# Patient Record
Sex: FEMALE | Race: WHITE | NOT HISPANIC OR LATINO | URBAN - METROPOLITAN AREA
[De-identification: names, ages, dates, MRNs, and addresses within clinical notes are randomized per-mention and may not be internally consistent; named-entity substitution may affect disease eponyms.]

---

## 2019-09-12 ENCOUNTER — OFFICE (OUTPATIENT)
Dept: URBAN - METROPOLITAN AREA CLINIC 75 | Facility: CLINIC | Age: 49
End: 2019-09-12

## 2019-09-12 VITALS
RESPIRATION RATE: 15 BRPM | DIASTOLIC BLOOD PRESSURE: 76 MMHG | WEIGHT: 155 LBS | HEIGHT: 65 IN | SYSTOLIC BLOOD PRESSURE: 116 MMHG

## 2019-09-12 DIAGNOSIS — K58.9 IRRITABLE BOWEL SYNDROME WITHOUT DIARRHEA: ICD-10-CM

## 2019-09-12 PROCEDURE — 99203 OFFICE O/P NEW LOW 30 MIN: CPT | Performed by: INTERNAL MEDICINE

## 2020-01-13 ENCOUNTER — OFFICE (OUTPATIENT)
Dept: URBAN - METROPOLITAN AREA CLINIC 75 | Facility: CLINIC | Age: 50
End: 2020-01-13

## 2020-01-13 VITALS
HEIGHT: 65 IN | SYSTOLIC BLOOD PRESSURE: 134 MMHG | DIASTOLIC BLOOD PRESSURE: 84 MMHG | HEART RATE: 72 BPM | WEIGHT: 153 LBS

## 2020-01-13 DIAGNOSIS — K58.9 IRRITABLE BOWEL SYNDROME WITHOUT DIARRHEA: ICD-10-CM

## 2020-01-13 PROCEDURE — 99213 OFFICE O/P EST LOW 20 MIN: CPT | Performed by: INTERNAL MEDICINE

## 2020-01-13 RX ORDER — LUBIPROSTONE 8 UG/1
16 CAPSULE, GELATIN COATED ORAL
Qty: 60 | Refills: 11 | Status: ACTIVE
Start: 2020-01-13

## 2020-12-30 ENCOUNTER — OFFICE (OUTPATIENT)
Dept: URBAN - METROPOLITAN AREA CLINIC 75 | Facility: CLINIC | Age: 50
End: 2020-12-30

## 2020-12-30 VITALS — HEART RATE: 70 BPM | TEMPERATURE: 96.8 F | HEIGHT: 65 IN | WEIGHT: 136 LBS | OXYGEN SATURATION: 99 %

## 2020-12-30 DIAGNOSIS — K58.9 IRRITABLE BOWEL SYNDROME WITHOUT DIARRHEA: ICD-10-CM

## 2020-12-30 DIAGNOSIS — R10.11 RIGHT UPPER QUADRANT PAIN: ICD-10-CM

## 2020-12-30 PROCEDURE — 99214 OFFICE O/P EST MOD 30 MIN: CPT | Performed by: NURSE PRACTITIONER

## 2021-11-20 ENCOUNTER — APPOINTMENT (OUTPATIENT)
Dept: CT IMAGING | Facility: HOSPITAL | Age: 51
End: 2021-11-20

## 2021-11-20 ENCOUNTER — HOSPITAL ENCOUNTER (EMERGENCY)
Facility: HOSPITAL | Age: 51
Discharge: HOME | End: 2021-11-20
Attending: EMERGENCY MEDICINE | Admitting: EMERGENCY MEDICINE

## 2021-11-20 VITALS
HEART RATE: 97 BPM | BODY MASS INDEX: 22.49 KG/M2 | WEIGHT: 135 LBS | DIASTOLIC BLOOD PRESSURE: 80 MMHG | HEIGHT: 65 IN | TEMPERATURE: 98.2 F | SYSTOLIC BLOOD PRESSURE: 127 MMHG | OXYGEN SATURATION: 98 % | RESPIRATION RATE: 18 BRPM

## 2021-11-20 DIAGNOSIS — K29.00 ACUTE GASTRITIS WITHOUT HEMORRHAGE, UNSPECIFIED GASTRITIS TYPE: ICD-10-CM

## 2021-11-20 DIAGNOSIS — R10.13 EPIGASTRIC PAIN: Primary | ICD-10-CM

## 2021-11-20 DIAGNOSIS — Z90.13 HISTORY OF MASTECTOMY, BILATERAL: ICD-10-CM

## 2021-11-20 LAB
ALBUMIN SERPL-MCNC: 4.6 G/DL (ref 3.5–5.2)
ALBUMIN/GLOB SERPL: 1.5 G/DL
ALP SERPL-CCNC: 71 U/L (ref 39–117)
ALT SERPL W P-5'-P-CCNC: 11 U/L (ref 1–33)
ANION GAP SERPL CALCULATED.3IONS-SCNC: 12.6 MMOL/L (ref 5–15)
AST SERPL-CCNC: 16 U/L (ref 1–32)
BASOPHILS # BLD AUTO: 0.04 10*3/MM3 (ref 0–0.2)
BASOPHILS NFR BLD AUTO: 0.4 % (ref 0–1.5)
BILIRUB SERPL-MCNC: 0.3 MG/DL (ref 0–1.2)
BUN SERPL-MCNC: 15 MG/DL (ref 6–20)
BUN/CREAT SERPL: 25.4 (ref 7–25)
CALCIUM SPEC-SCNC: 9.2 MG/DL (ref 8.6–10.5)
CHLORIDE SERPL-SCNC: 103 MMOL/L (ref 98–107)
CO2 SERPL-SCNC: 23.4 MMOL/L (ref 22–29)
CREAT SERPL-MCNC: 0.59 MG/DL (ref 0.57–1)
DEPRECATED RDW RBC AUTO: 42.5 FL (ref 37–54)
EOSINOPHIL # BLD AUTO: 0.13 10*3/MM3 (ref 0–0.4)
EOSINOPHIL NFR BLD AUTO: 1.2 % (ref 0.3–6.2)
ERYTHROCYTE [DISTWIDTH] IN BLOOD BY AUTOMATED COUNT: 12.8 % (ref 12.3–15.4)
GFR SERPL CREATININE-BSD FRML MDRD: 108 ML/MIN/1.73
GFR SERPL CREATININE-BSD FRML MDRD: 131 ML/MIN/1.73
GLOBULIN UR ELPH-MCNC: 3 GM/DL
GLUCOSE SERPL-MCNC: 107 MG/DL (ref 65–99)
HCT VFR BLD AUTO: 38 % (ref 34–46.6)
HGB BLD-MCNC: 12.9 G/DL (ref 12–15.9)
HOLD SPECIMEN: NORMAL
IMM GRANULOCYTES # BLD AUTO: 0.03 10*3/MM3 (ref 0–0.05)
IMM GRANULOCYTES NFR BLD AUTO: 0.3 % (ref 0–0.5)
LIPASE SERPL-CCNC: 56 U/L (ref 13–60)
LYMPHOCYTES # BLD AUTO: 2.1 10*3/MM3 (ref 0.7–3.1)
LYMPHOCYTES NFR BLD AUTO: 19.9 % (ref 19.6–45.3)
MCH RBC QN AUTO: 30.6 PG (ref 26.6–33)
MCHC RBC AUTO-ENTMCNC: 33.9 G/DL (ref 31.5–35.7)
MCV RBC AUTO: 90.3 FL (ref 79–97)
MONOCYTES # BLD AUTO: 0.72 10*3/MM3 (ref 0.1–0.9)
MONOCYTES NFR BLD AUTO: 6.8 % (ref 5–12)
NEUTROPHILS NFR BLD AUTO: 7.54 10*3/MM3 (ref 1.7–7)
NEUTROPHILS NFR BLD AUTO: 71.4 % (ref 42.7–76)
NRBC BLD AUTO-RTO: 0 /100 WBC (ref 0–0.2)
PLATELET # BLD AUTO: 354 10*3/MM3 (ref 140–450)
PMV BLD AUTO: 9.6 FL (ref 6–12)
POTASSIUM SERPL-SCNC: 3.8 MMOL/L (ref 3.5–5.2)
PROT SERPL-MCNC: 7.6 G/DL (ref 6–8.5)
RBC # BLD AUTO: 4.21 10*6/MM3 (ref 3.77–5.28)
SODIUM SERPL-SCNC: 139 MMOL/L (ref 136–145)
WBC NRBC COR # BLD: 10.56 10*3/MM3 (ref 3.4–10.8)
WHOLE BLOOD HOLD SPECIMEN: NORMAL
WHOLE BLOOD HOLD SPECIMEN: NORMAL

## 2021-11-20 PROCEDURE — 25010000002 HYDROMORPHONE PER 4 MG: Performed by: EMERGENCY MEDICINE

## 2021-11-20 PROCEDURE — 25010000002 HYDROMORPHONE 1 MG/ML SOLUTION: Performed by: EMERGENCY MEDICINE

## 2021-11-20 PROCEDURE — 96374 THER/PROPH/DIAG INJ IV PUSH: CPT

## 2021-11-20 PROCEDURE — 80053 COMPREHEN METABOLIC PANEL: CPT | Performed by: EMERGENCY MEDICINE

## 2021-11-20 PROCEDURE — 25010000002 IOPAMIDOL 61 % SOLUTION: Performed by: EMERGENCY MEDICINE

## 2021-11-20 PROCEDURE — 96375 TX/PRO/DX INJ NEW DRUG ADDON: CPT

## 2021-11-20 PROCEDURE — 99283 EMERGENCY DEPT VISIT LOW MDM: CPT

## 2021-11-20 PROCEDURE — 25010000002 ONDANSETRON PER 1 MG: Performed by: EMERGENCY MEDICINE

## 2021-11-20 PROCEDURE — 74177 CT ABD & PELVIS W/CONTRAST: CPT

## 2021-11-20 PROCEDURE — 36415 COLL VENOUS BLD VENIPUNCTURE: CPT

## 2021-11-20 PROCEDURE — 85025 COMPLETE CBC W/AUTO DIFF WBC: CPT | Performed by: EMERGENCY MEDICINE

## 2021-11-20 PROCEDURE — 83690 ASSAY OF LIPASE: CPT | Performed by: EMERGENCY MEDICINE

## 2021-11-20 PROCEDURE — 96376 TX/PRO/DX INJ SAME DRUG ADON: CPT

## 2021-11-20 RX ORDER — PROMETHAZINE HYDROCHLORIDE 25 MG/1
25 TABLET ORAL EVERY 6 HOURS PRN
Qty: 10 TABLET | Refills: 0 | Status: SHIPPED | OUTPATIENT
Start: 2021-11-20 | End: 2022-10-27 | Stop reason: SDUPTHER

## 2021-11-20 RX ORDER — OMEPRAZOLE 40 MG/1
40 CAPSULE, DELAYED RELEASE ORAL 2 TIMES DAILY
Qty: 60 CAPSULE | Refills: 3 | Status: SHIPPED | OUTPATIENT
Start: 2021-11-20 | End: 2022-10-04 | Stop reason: SDUPTHER

## 2021-11-20 RX ORDER — HYDROMORPHONE HYDROCHLORIDE 1 MG/ML
0.5 INJECTION, SOLUTION INTRAMUSCULAR; INTRAVENOUS; SUBCUTANEOUS ONCE
Status: COMPLETED | OUTPATIENT
Start: 2021-11-20 | End: 2021-11-20

## 2021-11-20 RX ORDER — ONDANSETRON 2 MG/ML
4 INJECTION INTRAMUSCULAR; INTRAVENOUS ONCE
Status: COMPLETED | OUTPATIENT
Start: 2021-11-20 | End: 2021-11-20

## 2021-11-20 RX ORDER — PANTOPRAZOLE SODIUM 40 MG/10ML
80 INJECTION, POWDER, LYOPHILIZED, FOR SOLUTION INTRAVENOUS ONCE
Status: COMPLETED | OUTPATIENT
Start: 2021-11-20 | End: 2021-11-20

## 2021-11-20 RX ADMIN — SODIUM CHLORIDE 1000 ML: 9 INJECTION, SOLUTION INTRAVENOUS at 18:47

## 2021-11-20 RX ADMIN — HYDROMORPHONE HYDROCHLORIDE 0.5 MG: 1 INJECTION, SOLUTION INTRAMUSCULAR; INTRAVENOUS; SUBCUTANEOUS at 21:25

## 2021-11-20 RX ADMIN — HYDROMORPHONE HYDROCHLORIDE 1 MG: 1 INJECTION, SOLUTION INTRAMUSCULAR; INTRAVENOUS; SUBCUTANEOUS at 20:54

## 2021-11-20 RX ADMIN — IOPAMIDOL 85 ML: 612 INJECTION, SOLUTION INTRAVENOUS at 20:03

## 2021-11-20 RX ADMIN — ONDANSETRON 4 MG: 2 INJECTION INTRAMUSCULAR; INTRAVENOUS at 20:54

## 2021-11-20 RX ADMIN — PANTOPRAZOLE SODIUM 80 MG: 40 INJECTION, POWDER, FOR SOLUTION INTRAVENOUS at 21:24

## 2021-11-20 NOTE — ED NOTES
C/o of having abdominal pain. Pain radiates from chest to rib to back. Pt states she has been unable to keep anything down since last night. Pt had double mastectomy and currently has 2 drains.   This rn wearing mask and goggles. Pt wearing mask during encounter.        Luana Merino, RN  11/20/21 0499

## 2021-11-20 NOTE — ED PROVIDER NOTES
EMERGENCY DEPARTMENT ENCOUNTER    Room Number:  40/40  Date of encounter:  11/20/2021  PCP: Jose Wilhelm MD  Historian: Patient,  at bedside    I used full protective equipment while examining this patient.  This includes face mask, gloves and protective eyewear.  I washed my hands before entering the room and immediately upon leaving the room      HPI:  Chief Complaint: Abdominal pain  A complete HPI/ROS/PMH/PSH/SH/FH are unobtainable due to: None    Context: Jennie Pederson is a 50 y.o. female who presents to the ED c/o abdominal pain.  Patient has had ongoing abdominal pain for over 1 year.  Symptoms have worsened over the last several months.  Pain is generally epigastric in nature and is worsened at night.  Nothing tends to really help the pain much.  Pain is been worsened over the last several days.  Pain is currently moderate.  Patient has had vomiting x4 over the last 24 hours.  Patient denies any diarrhea and has had normal bowel output.  Patient denies any dysuria or urinary problems.  She does have a history of interstitial cystitis.  She states she has had work-up over the last year and is seen Dr. Emre Traylor from GI.  She has had ultrasound and HIDA scan that were reportedly unremarkable.  She states that she had a CT scan about 6 weeks ago that was also unremarkable.  Patient does have a history of local breast cancer and was treated with bilateral mastectomies in [place] several weeks ago.  Patient still has several drains in place.  She had no abdominal surgery during that procedure.  Patient does report a history of appendectomy.  She is also had diagnostic laparoscopy.    MEDICAL RECORD REVIEW  Did review prior medical records including CT scan from September at [facility].  CT scan was fairly unremarkable without apparent cause right upper quadrant pain.    PAST MEDICAL HISTORY  Active Ambulatory Problems     Diagnosis Date Noted   • No Active Ambulatory Problems      Resolved Ambulatory Problems     Diagnosis Date Noted   • No Resolved Ambulatory Problems     No Additional Past Medical History         PAST SURGICAL HISTORY  No past surgical history on file.      FAMILY HISTORY  No family history on file.      SOCIAL HISTORY  Social History     Socioeconomic History   • Marital status: Single         ALLERGIES  Lyrica [pregabalin] and Bactrim [sulfamethoxazole-trimethoprim]       REVIEW OF SYSTEMS  Review of Systems   Constitutional: Negative.  Negative for fever.   HENT: Negative.  Negative for sore throat.    Eyes: Negative.    Respiratory: Negative.  Negative for cough.    Cardiovascular: Negative.  Negative for chest pain.   Gastrointestinal: Positive for abdominal pain, nausea and vomiting.   Genitourinary: Negative.  Negative for dysuria.   Musculoskeletal: Negative.  Negative for back pain.   Skin: Negative.  Negative for rash.   Neurological: Negative.  Negative for headaches.   All other systems reviewed and are negative.          PHYSICAL EXAM    I have reviewed the triage vital signs and nursing notes.    ED Triage Vitals [11/20/21 1748]   Temp Heart Rate Resp BP SpO2   98.2 °F (36.8 °C) 92 18 -- 98 %      Temp src Heart Rate Source Patient Position BP Location FiO2 (%)   Tympanic -- -- -- --       Physical Exam  GENERAL: Alert female in no obvious distress.  Triage vitals are reviewed and are fairly unremarkable.  HENT: nares patent  EYES: no scleral icterus  CV: regular rhythm, regular rate-no murmur  RESPIRATORY: normal effort, clear to auscultation bilaterally  ABDOMEN: soft moderate epigastric and periumbilical tenderness without rebound or guarding  MUSCULOSKELETAL: no deformity  NEURO: Strength, sensation, and coordination are grossly intact.  Speech and mentation are unremarkable  SKIN: warm, dry      LAB RESULTS  Recent Results (from the past 24 hour(s))   Green Top (Gel)    Collection Time: 11/20/21  6:14 PM   Result Value Ref Range    Extra Tube Hold for  add-ons.    Lavender Top    Collection Time: 11/20/21  6:14 PM   Result Value Ref Range    Extra Tube hold for add-on    Gold Top - SST    Collection Time: 11/20/21  6:14 PM   Result Value Ref Range    Extra Tube Hold for add-ons.    Light Blue Top    Collection Time: 11/20/21  6:14 PM   Result Value Ref Range    Extra Tube hold for add-on    Comprehensive Metabolic Panel    Collection Time: 11/20/21  6:14 PM    Specimen: Blood   Result Value Ref Range    Glucose 107 (H) 65 - 99 mg/dL    BUN 15 6 - 20 mg/dL    Creatinine 0.59 0.57 - 1.00 mg/dL    Sodium 139 136 - 145 mmol/L    Potassium 3.8 3.5 - 5.2 mmol/L    Chloride 103 98 - 107 mmol/L    CO2 23.4 22.0 - 29.0 mmol/L    Calcium 9.2 8.6 - 10.5 mg/dL    Total Protein 7.6 6.0 - 8.5 g/dL    Albumin 4.60 3.50 - 5.20 g/dL    ALT (SGPT) 11 1 - 33 U/L    AST (SGOT) 16 1 - 32 U/L    Alkaline Phosphatase 71 39 - 117 U/L    Total Bilirubin 0.3 0.0 - 1.2 mg/dL    eGFR Non African Amer 108 >60 mL/min/1.73    eGFR  African Amer 131 >60 mL/min/1.73    Globulin 3.0 gm/dL    A/G Ratio 1.5 g/dL    BUN/Creatinine Ratio 25.4 (H) 7.0 - 25.0    Anion Gap 12.6 5.0 - 15.0 mmol/L   Lipase    Collection Time: 11/20/21  6:14 PM    Specimen: Blood   Result Value Ref Range    Lipase 56 13 - 60 U/L   CBC Auto Differential    Collection Time: 11/20/21  6:14 PM    Specimen: Blood   Result Value Ref Range    WBC 10.56 3.40 - 10.80 10*3/mm3    RBC 4.21 3.77 - 5.28 10*6/mm3    Hemoglobin 12.9 12.0 - 15.9 g/dL    Hematocrit 38.0 34.0 - 46.6 %    MCV 90.3 79.0 - 97.0 fL    MCH 30.6 26.6 - 33.0 pg    MCHC 33.9 31.5 - 35.7 g/dL    RDW 12.8 12.3 - 15.4 %    RDW-SD 42.5 37.0 - 54.0 fl    MPV 9.6 6.0 - 12.0 fL    Platelets 354 140 - 450 10*3/mm3    Neutrophil % 71.4 42.7 - 76.0 %    Lymphocyte % 19.9 19.6 - 45.3 %    Monocyte % 6.8 5.0 - 12.0 %    Eosinophil % 1.2 0.3 - 6.2 %    Basophil % 0.4 0.0 - 1.5 %    Immature Grans % 0.3 0.0 - 0.5 %    Neutrophils, Absolute 7.54 (H) 1.70 - 7.00 10*3/mm3     Lymphocytes, Absolute 2.10 0.70 - 3.10 10*3/mm3    Monocytes, Absolute 0.72 0.10 - 0.90 10*3/mm3    Eosinophils, Absolute 0.13 0.00 - 0.40 10*3/mm3    Basophils, Absolute 0.04 0.00 - 0.20 10*3/mm3    Immature Grans, Absolute 0.03 0.00 - 0.05 10*3/mm3    nRBC 0.0 0.0 - 0.2 /100 WBC       Ordered the above labs and independently reviewed the results.      RADIOLOGY  CT Abdomen Pelvis With Contrast    Result Date: 11/20/2021  Patient: DAVID GALEAS  Time Out: 20:51 Exam(s): CT ABDOMEN + PELVIS With Contrast EXAM:   CT Abdomen and Pelvis With Intravenous Contrast CLINICAL HISTORY:    Reason for exam: Abdominal pain. TECHNIQUE:   Axial computed tomography images of the abdomen and pelvis with intravenous contrast.  CTDI is 13.8 mGy and DLP is 667.1 mGy-cm.  This CT exam was performed according to the principle of ALARA (As Low As Reasonably Achievable) by using one or more of the following dose reduction techniques: automated exposure control, adjustment of the mA and or kV according to patient size, and or use of iterative reconstruction technique. COMPARISON:   No relevant prior studies available. FINDINGS:   Lung bases:  Unremarkable.  ABDOMEN:   Liver:  Small hepatic cysts.   Gallbladder and bile ducts:  Unremarkable.  No calcified stones.   Pancreas:  Unremarkable.   Spleen:  Unremarkable.   Adrenals:  Unremarkable.   Kidneys and ureters:  Unremarkable.  No hydronephrosis.   Stomach and bowel:  Mildly thickened distal stomach and proximal duodenum which may be underdistention versus PUD gastritis duodenitis.  Moderate colonic stool which may be ileus constipation.  No mechanical bowel obstruction.  No diverticulitis.  PELVIS:   Appendix:  No findings to suggest acute appendicitis.   Bladder:  Unremarkable.   Reproductive:  Unremarkable as visualized.  ABDOMEN and PELVIS:   Intraperitoneal space:  No free air.   Bones joints:  No acute fracture.   Soft tissues:  Unremarkable.   Vasculature:  Atherosclerosis.    Lymph nodes:  Unremarkable. IMPRESSION: 1.  Mildly thickened distal stomach and proximal duodenum which may be underdistention versus PUD gastritis duodenitis. 2.  Moderate colonic stool which may be ileus constipation.  No mechanical bowel obstruction.  No diverticulitis.     Electronically signed by Scott Donovan M.D. on 11-20-21 at 2051      I ordered the above noted radiological studies. Reviewed by me and discussed with radiologist.  See dictation for official radiology interpretation.      PROCEDURES  Procedures      MEDICATIONS GIVEN IN ER    Medications   HYDROmorphone (DILAUDID) injection 0.5 mg (has no administration in time range)   pantoprazole (PROTONIX) injection 80 mg (has no administration in time range)   sodium chloride 0.9 % bolus 1,000 mL (0 mL Intravenous Stopped 11/20/21 2057)   iopamidol (ISOVUE-300) 61 % injection 100 mL (85 mL Intravenous Given 11/20/21 2003)   HYDROmorphone (DILAUDID) injection 1 mg (1 mg Intravenous Given 11/20/21 2054)   ondansetron (ZOFRAN) injection 4 mg (4 mg Intravenous Given 11/20/21 2054)         PROGRESS, DATA ANALYSIS, CONSULTS, AND MEDICAL DECISION MAKING    All labs have been independently reviewed by me.  All radiology studies have been reviewed by me and discussed with radiologist dictating the report.   EKG's independently viewed and interpreted by me.  Discussion below represents my analysis of pertinent findings related to patient's condition, differential diagnosis, treatment plan and final disposition.      ED Course as of 11/20/21 2120   Sat Nov 20, 2021   [age] [id number]old female presents with ongoing abdominal pain over 1 year.  Symptoms have worsened here recently.  Pain is epigastric and worsened at night.  Exam shows mild to moderate diffuse tenderness in the midline.  Differential diagnosis is quite broad.  Would include GI problems such as gastritis, cholecystitis or cholelithiasis or pancreatitis.  Would consider renal problems although patient  is having no dysuria or hematuria. [DB]   1843 Patient states her pain has increased after the CT scan with IV contrast.  At this point she has now asking that we give her something for pain and nausea.  Will give Dilaudid and Zofran to help with that pain and nausea. [DB]   1914 Demonstrates are reviewed and are fairly unremarkable.  BUN and creatinine are 15.59 which would go against dehydration.  LFTs electrolytes are also reassuring. [DB]   1914 Lipase falls within normal limits at 56. [DB]   1922 CBC is reviewed and is fairly unremarkable.  The white blood count red blood counts are within normal limits.  Results of testing were shared with patient and  at bedside. [DB]   2050 I discussed results of CT scan with patient and  at bedside.  I suspect most of her pain is from significant gastritis likely from psychological and physical stress of recent bilateral mastectomy.  Will treat with acid blocking medication. [DB]      ED Course User Index  [DB] Saurabh Luz MD       AS OF 21:20 EST VITALS:    BP - 132/91  HR - 81  TEMP - 98.2 °F (36.8 °C) (Tympanic)  O2 SATS - 100%      DIAGNOSIS  Final diagnoses:   Epigastric pain   Acute gastritis without hemorrhage, unspecified gastritis type   History of mastectomy, bilateral         DISPOSITION  DISCHARGE    Patient discharged in stable condition.    Reviewed implications of results, diagnosis, meds, responsibility to follow up, warning signs and symptoms of possible worsening, potential complications and reasons to return to ER, including increased pain, fever or as needed.    Patient/Family voiced understanding of above instructions.    Discussed plan for discharge, as there is no emergent indication for admission. Patient referred to primary care provider for BP management due to today's BP. Pt/family is agreeable and understands need for follow up and repeat testing.  Pt is aware that discharge does not mean that nothing is wrong but it indicates  no emergency is present that requires admission and they must continue care with follow-up as given below or physician of their choice.     FOLLOW-UP  Jose Wilhelm MD  [id number]  [facility] [id number] [phone]    In 1 week  If Not Better    Caitlyn Guzman MD  [address]  [place] [address]  [phone]    In 1 week  If Not Better, Call for Appointment         Medication List      New Prescriptions    omeprazole 40 MG capsule  Commonly known as: priLOSEC  Take 1 capsule by mouth 2 (Two) Times a Day.     promethazine 25 MG tablet  Commonly known as: PHENERGAN  Take 1 tablet by mouth Every 6 (Six) Hours As Needed for Nausea or Vomiting.           Where to Get Your Medications      These medications were sent to [id number] - [facility], [place] - [address] - [phone]  - [phone] FX  [address] [place] [address]    Phone: [phone]   · omeprazole 40 MG capsule  · promethazine 25 MG tablet                Saurabh Lzu MD  11/20/21 9649

## 2021-11-21 NOTE — DISCHARGE INSTRUCTIONS
Your CT scan showed irritation in the distal part of the stomach and proximal part of the small intestine consistent with gastritis.  I suspect that this was exacerbated by your recent surgery.  This should improve with acid blocking medication taken twice daily.  You may decrease to once daily on the Prilosec if symptoms improve in 2 to 3 weeks.

## 2022-05-16 ENCOUNTER — HOSPITAL ENCOUNTER (OUTPATIENT)
Dept: CARDIOLOGY | Facility: HOSPITAL | Age: 52
Discharge: HOME | End: 2022-05-16
Admitting: OTOLARYNGOLOGY

## 2022-05-16 ENCOUNTER — TRANSCRIBE ORDERS (OUTPATIENT)
Dept: CARDIOLOGY | Facility: HOSPITAL | Age: 52
End: 2022-05-16

## 2022-05-16 DIAGNOSIS — Z01.818 PRE-OP TESTING: ICD-10-CM

## 2022-05-16 DIAGNOSIS — Z01.818 PRE-OP TESTING: Primary | ICD-10-CM

## 2022-05-16 LAB — QT INTERVAL: 351 MS

## 2022-05-16 PROCEDURE — 93005 ELECTROCARDIOGRAM TRACING: CPT | Performed by: OTOLARYNGOLOGY

## 2022-05-16 PROCEDURE — 93010 ELECTROCARDIOGRAM REPORT: CPT | Performed by: INTERNAL MEDICINE

## 2022-10-04 ENCOUNTER — HOSPITAL ENCOUNTER (EMERGENCY)
Facility: HOSPITAL | Age: 52
Discharge: HOME | End: 2022-10-04
Attending: EMERGENCY MEDICINE | Admitting: EMERGENCY MEDICINE

## 2022-10-04 VITALS
TEMPERATURE: 99.3 F | DIASTOLIC BLOOD PRESSURE: 99 MMHG | BODY MASS INDEX: 22.49 KG/M2 | SYSTOLIC BLOOD PRESSURE: 127 MMHG | HEART RATE: 83 BPM | HEIGHT: 65 IN | RESPIRATION RATE: 16 BRPM | WEIGHT: 135 LBS | OXYGEN SATURATION: 98 %

## 2022-10-04 DIAGNOSIS — K29.00 ACUTE GASTRITIS, PRESENCE OF BLEEDING UNSPECIFIED, UNSPECIFIED GASTRITIS TYPE: Primary | ICD-10-CM

## 2022-10-04 LAB
ALBUMIN SERPL-MCNC: 4.7 G/DL (ref 3.5–5.2)
ALBUMIN/GLOB SERPL: 1.7 G/DL
ALP SERPL-CCNC: 77 U/L (ref 39–117)
ALT SERPL W P-5'-P-CCNC: 5 U/L (ref 1–33)
ANION GAP SERPL CALCULATED.3IONS-SCNC: 14 MMOL/L (ref 5–15)
AST SERPL-CCNC: 14 U/L (ref 1–32)
BASOPHILS # BLD AUTO: 0.04 10*3/MM3 (ref 0–0.2)
BASOPHILS NFR BLD AUTO: 0.4 % (ref 0–1.5)
BILIRUB SERPL-MCNC: 0.3 MG/DL (ref 0–1.2)
BILIRUB UR QL STRIP: NEGATIVE
BUN SERPL-MCNC: 15 MG/DL (ref 6–20)
BUN/CREAT SERPL: 22.4 (ref 7–25)
CALCIUM SPEC-SCNC: 9.7 MG/DL (ref 8.6–10.5)
CHLORIDE SERPL-SCNC: 98 MMOL/L (ref 98–107)
CLARITY UR: CLEAR
CO2 SERPL-SCNC: 22 MMOL/L (ref 22–29)
COLOR UR: YELLOW
CREAT SERPL-MCNC: 0.67 MG/DL (ref 0.57–1)
D-LACTATE SERPL-SCNC: 1.2 MMOL/L (ref 0.5–2)
DEPRECATED RDW RBC AUTO: 48.6 FL (ref 37–54)
EGFRCR SERPLBLD CKD-EPI 2021: 106 ML/MIN/1.73
EOSINOPHIL # BLD AUTO: 0.05 10*3/MM3 (ref 0–0.4)
EOSINOPHIL NFR BLD AUTO: 0.5 % (ref 0.3–6.2)
ERYTHROCYTE [DISTWIDTH] IN BLOOD BY AUTOMATED COUNT: 15.2 % (ref 12.3–15.4)
GLOBULIN UR ELPH-MCNC: 2.8 GM/DL
GLUCOSE SERPL-MCNC: 105 MG/DL (ref 65–99)
GLUCOSE UR STRIP-MCNC: NEGATIVE MG/DL
HCT VFR BLD AUTO: 40.1 % (ref 34–46.6)
HGB BLD-MCNC: 13.2 G/DL (ref 12–15.9)
HGB UR QL STRIP.AUTO: NEGATIVE
HOLD SPECIMEN: NORMAL
HOLD SPECIMEN: NORMAL
IMM GRANULOCYTES # BLD AUTO: 0.05 10*3/MM3 (ref 0–0.05)
IMM GRANULOCYTES NFR BLD AUTO: 0.5 % (ref 0–0.5)
KETONES UR QL STRIP: ABNORMAL
LEUKOCYTE ESTERASE UR QL STRIP.AUTO: NEGATIVE
LIPASE SERPL-CCNC: 45 U/L (ref 13–60)
LYMPHOCYTES # BLD AUTO: 1.68 10*3/MM3 (ref 0.7–3.1)
LYMPHOCYTES NFR BLD AUTO: 17.5 % (ref 19.6–45.3)
MCH RBC QN AUTO: 28.6 PG (ref 26.6–33)
MCHC RBC AUTO-ENTMCNC: 32.9 G/DL (ref 31.5–35.7)
MCV RBC AUTO: 87 FL (ref 79–97)
MONOCYTES # BLD AUTO: 0.59 10*3/MM3 (ref 0.1–0.9)
MONOCYTES NFR BLD AUTO: 6.2 % (ref 5–12)
NEUTROPHILS NFR BLD AUTO: 7.17 10*3/MM3 (ref 1.7–7)
NEUTROPHILS NFR BLD AUTO: 74.9 % (ref 42.7–76)
NITRITE UR QL STRIP: NEGATIVE
NRBC BLD AUTO-RTO: 0 /100 WBC (ref 0–0.2)
PH UR STRIP.AUTO: <=5 [PH] (ref 5–8)
PLATELET # BLD AUTO: 348 10*3/MM3 (ref 140–450)
PMV BLD AUTO: 9.4 FL (ref 6–12)
POTASSIUM SERPL-SCNC: 3.6 MMOL/L (ref 3.5–5.2)
PROT SERPL-MCNC: 7.5 G/DL (ref 6–8.5)
PROT UR QL STRIP: ABNORMAL
RBC # BLD AUTO: 4.61 10*6/MM3 (ref 3.77–5.28)
SODIUM SERPL-SCNC: 134 MMOL/L (ref 136–145)
SP GR UR STRIP: 1.02 (ref 1–1.03)
UROBILINOGEN UR QL STRIP: ABNORMAL
WBC NRBC COR # BLD: 9.58 10*3/MM3 (ref 3.4–10.8)
WHOLE BLOOD HOLD COAG: NORMAL
WHOLE BLOOD HOLD SPECIMEN: NORMAL

## 2022-10-04 PROCEDURE — 83690 ASSAY OF LIPASE: CPT | Performed by: EMERGENCY MEDICINE

## 2022-10-04 PROCEDURE — 80053 COMPREHEN METABOLIC PANEL: CPT | Performed by: EMERGENCY MEDICINE

## 2022-10-04 PROCEDURE — 81003 URINALYSIS AUTO W/O SCOPE: CPT | Performed by: EMERGENCY MEDICINE

## 2022-10-04 PROCEDURE — 85025 COMPLETE CBC W/AUTO DIFF WBC: CPT | Performed by: EMERGENCY MEDICINE

## 2022-10-04 PROCEDURE — 99283 EMERGENCY DEPT VISIT LOW MDM: CPT

## 2022-10-04 PROCEDURE — 83605 ASSAY OF LACTIC ACID: CPT | Performed by: EMERGENCY MEDICINE

## 2022-10-04 PROCEDURE — 25010000002 ONDANSETRON PER 1 MG: Performed by: EMERGENCY MEDICINE

## 2022-10-04 PROCEDURE — 96374 THER/PROPH/DIAG INJ IV PUSH: CPT

## 2022-10-04 RX ORDER — SUCRALFATE 1 G/1
1 TABLET ORAL 4 TIMES DAILY
Qty: 120 TABLET | Refills: 0 | Status: SHIPPED | OUTPATIENT
Start: 2022-10-04 | End: 2022-10-27 | Stop reason: SDUPTHER

## 2022-10-04 RX ORDER — DIAZEPAM 5 MG/1
5 TABLET ORAL 2 TIMES DAILY PRN
COMMUNITY

## 2022-10-04 RX ORDER — VENLAFAXINE 37.5 MG/1
37.5 TABLET ORAL DAILY
COMMUNITY

## 2022-10-04 RX ORDER — SODIUM CHLORIDE 0.9 % (FLUSH) 0.9 %
10 SYRINGE (ML) INJECTION AS NEEDED
Status: DISCONTINUED | OUTPATIENT
Start: 2022-10-04 | End: 2022-10-04 | Stop reason: HOSPADM

## 2022-10-04 RX ORDER — OMEPRAZOLE 40 MG/1
40 CAPSULE, DELAYED RELEASE ORAL 2 TIMES DAILY
Qty: 60 CAPSULE | Refills: 0 | Status: SHIPPED | OUTPATIENT
Start: 2022-10-04 | End: 2022-10-27

## 2022-10-04 RX ORDER — LIDOCAINE HYDROCHLORIDE 20 MG/ML
15 SOLUTION OROPHARYNGEAL ONCE
Status: COMPLETED | OUTPATIENT
Start: 2022-10-04 | End: 2022-10-04

## 2022-10-04 RX ORDER — ALUMINA, MAGNESIA, AND SIMETHICONE 2400; 2400; 240 MG/30ML; MG/30ML; MG/30ML
15 SUSPENSION ORAL ONCE
Status: COMPLETED | OUTPATIENT
Start: 2022-10-04 | End: 2022-10-04

## 2022-10-04 RX ORDER — ONDANSETRON 2 MG/ML
4 INJECTION INTRAMUSCULAR; INTRAVENOUS ONCE
Status: COMPLETED | OUTPATIENT
Start: 2022-10-04 | End: 2022-10-04

## 2022-10-04 RX ORDER — TRAMADOL HYDROCHLORIDE 50 MG/1
50 TABLET ORAL EVERY 6 HOURS PRN
COMMUNITY

## 2022-10-04 RX ADMIN — SODIUM CHLORIDE 1000 ML: 9 INJECTION, SOLUTION INTRAVENOUS at 16:21

## 2022-10-04 RX ADMIN — ONDANSETRON 4 MG: 2 INJECTION INTRAMUSCULAR; INTRAVENOUS at 16:29

## 2022-10-04 RX ADMIN — ALUMINUM HYDROXIDE, MAGNESIUM HYDROXIDE, AND DIMETHICONE 15 ML: 400; 400; 40 SUSPENSION ORAL at 16:28

## 2022-10-04 RX ADMIN — LIDOCAINE HYDROCHLORIDE 15 ML: 20 SOLUTION ORAL; TOPICAL at 16:28

## 2022-10-04 NOTE — ED TRIAGE NOTES
PT states that she has had abdominal pain with N/V for the last 5 days. States that she has not been able to keep anything down.     Patient masked at arrival and triage staff wore all appropriate PPE during entire encounter with patient.

## 2022-10-04 NOTE — ED PROVIDER NOTES
EMERGENCY DEPARTMENT ENCOUNTER    Room Number:  27/27  Date of encounter:  10/4/2022  PCP: Jose Wilhelm MD  Historian: Patient      HPI:  Chief Complaint: Abdominal pain, nausea and vomiting    A complete HPI/ROS/PMH/PSH/SH/FH are unobtainable due to: N/A    Context: Jennie Pederson is a 51 y.o. female who presents to the ED c/o progressive abdominal pain, nausea and vomiting over the past few weeks.  Pain is located in the epigastrium, it is described as burning, is severe at times and is rather constant.  Sometimes it is worse with food sometimes it is worse if she does not eat.  She has had nausea and nonbloody/nonbilious vomiting over the past few days.  She also reports some dark, tarry stools.  She had the symptoms last year was diagnosed with gastritis.  She was started on a PPI which she self DC'd several months ago.  No fevers or chills.  No cough, congestion or trouble breathing.      Summary of prior records: She reports prior negative gallbladder ultrasound/HIDA scan she has a history of localized breast cancer status post bilateral mastectomy.  Prior appendectomy.  No recent ER visits or hospitalizations at [facility].    The patient was placed in a mask in triage, hand hygiene was performed before and after my interaction with the patient.  I wore a mask, safety glasses and gloves during my entire interaction with the patient.    PAST MEDICAL HISTORY  Active Ambulatory Problems     Diagnosis Date Noted   • No Active Ambulatory Problems     Resolved Ambulatory Problems     Diagnosis Date Noted   • No Resolved Ambulatory Problems     Past Medical History:   Diagnosis Date   • Gastritis 2021         PAST SURGICAL HISTORY  Past Surgical History:   Procedure Laterality Date   • BREAST RECONSTRUCTION Bilateral     2022   • MASTECTOMY Bilateral 2021         FAMILY HISTORY  History reviewed. No pertinent family history.      SOCIAL HISTORY  Social History     Socioeconomic History    • Marital status: Single   Tobacco Use   • Smoking status: Never Smoker   • Smokeless tobacco: Never Used   Vaping Use   • Vaping Use: Never used   Substance and Sexual Activity   • Alcohol use: Defer   • Drug use: Defer   • Sexual activity: Defer         ALLERGIES  Lyrica [pregabalin] and Bactrim [sulfamethoxazole-trimethoprim]        REVIEW OF SYSTEMS  Review of Systems   Constitutional: Positive for fatigue. Negative for chills and fever.   Respiratory: Negative for shortness of breath.    Cardiovascular: Negative for chest pain.   Gastrointestinal: Positive for abdominal pain, nausea and vomiting.   Genitourinary: Negative for difficulty urinating.        All systems reviewed and negative except for those discussed in HPI.       PHYSICAL EXAM    I have reviewed the triage vital signs and nursing notes.    ED Triage Vitals   Temp Heart Rate Resp BP SpO2   10/04/22 1454 10/04/22 1454 10/04/22 1454 10/04/22 1455 10/04/22 1454   99.3 °F (37.4 °C) (!) 126 16 124/94 97 %      Temp src Heart Rate Source Patient Position BP Location FiO2 (%)   10/04/22 1454 10/04/22 1454 10/04/22 1455 -- --   Tympanic Monitor Standing         Physical Exam   Constitutional: Pt. is oriented to person, place, and time and well-developed, well-nourished, and in no distress.   HENT: Normocephalic and atraumatic.   Neck: Normal range of motion. Neck supple. No JVD present.   Cardiovascular: Normal rate, regular rhythm and normal heart sounds. No murmur heard.  Pulmonary/Chest: Effort normal and breath sounds normal. No stridor. No respiratory distress. No wheezes, no rales.   Abdominal: Soft. Bowel sounds are normal. No distension. There is moderate epigastric and right upper quadrant tenderness. There is no rebound and no guarding.   Musculoskeletal: Normal range of motion. No edema, tenderness or deformity.   Neurological: Pt. is alert and oriented to person, place, and time.  She has no focal neurologic deficits  Skin: Skin is warm and  dry. No rash noted. Pt. is not diaphoretic. No erythema.   Psychiatric: Mood, affect and judgment normal.  She is pleasant and cooperative.  Nursing note and vitals reviewed.        LAB RESULTS  Recent Results (from the past 24 hour(s))   Urinalysis With Microscopic If Indicated (No Culture) - Urine, Clean Catch    Collection Time: 10/04/22  4:13 PM    Specimen: Urine, Clean Catch   Result Value Ref Range    Color, UA Yellow Yellow, Straw    Appearance, UA Clear Clear    pH, UA <=5.0 5.0 - 8.0    Specific Gravity, UA 1.022 1.005 - 1.030    Glucose, UA Negative Negative    Ketones, UA 15 mg/dL (1+) (A) Negative    Bilirubin, UA Negative Negative    Blood, UA Negative Negative    Protein, UA Trace (A) Negative    Leuk Esterase, UA Negative Negative    Nitrite, UA Negative Negative    Urobilinogen, UA 0.2 E.U./dL 0.2 - 1.0 E.U./dL   Comprehensive Metabolic Panel    Collection Time: 10/04/22  4:20 PM    Specimen: Blood   Result Value Ref Range    Glucose 105 (H) 65 - 99 mg/dL    BUN 15 6 - 20 mg/dL    Creatinine 0.67 0.57 - 1.00 mg/dL    Sodium 134 (L) 136 - 145 mmol/L    Potassium 3.6 3.5 - 5.2 mmol/L    Chloride 98 98 - 107 mmol/L    CO2 22.0 22.0 - 29.0 mmol/L    Calcium 9.7 8.6 - 10.5 mg/dL    Total Protein 7.5 6.0 - 8.5 g/dL    Albumin 4.70 3.50 - 5.20 g/dL    ALT (SGPT) 5 1 - 33 U/L    AST (SGOT) 14 1 - 32 U/L    Alkaline Phosphatase 77 39 - 117 U/L    Total Bilirubin 0.3 0.0 - 1.2 mg/dL    Globulin 2.8 gm/dL    A/G Ratio 1.7 g/dL    BUN/Creatinine Ratio 22.4 7.0 - 25.0    Anion Gap 14.0 5.0 - 15.0 mmol/L    eGFR 106.0 >60.0 mL/min/1.73   Lipase    Collection Time: 10/04/22  4:20 PM    Specimen: Blood   Result Value Ref Range    Lipase 45 13 - 60 U/L   Lactic Acid, Plasma    Collection Time: 10/04/22  4:20 PM    Specimen: Blood   Result Value Ref Range    Lactate 1.2 0.5 - 2.0 mmol/L   CBC Auto Differential    Collection Time: 10/04/22  4:20 PM    Specimen: Blood   Result Value Ref Range    WBC 9.58 3.40 - 10.80  10*3/mm3    RBC 4.61 3.77 - 5.28 10*6/mm3    Hemoglobin 13.2 12.0 - 15.9 g/dL    Hematocrit 40.1 34.0 - 46.6 %    MCV 87.0 79.0 - 97.0 fL    MCH 28.6 26.6 - 33.0 pg    MCHC 32.9 31.5 - 35.7 g/dL    RDW 15.2 12.3 - 15.4 %    RDW-SD 48.6 37.0 - 54.0 fl    MPV 9.4 6.0 - 12.0 fL    Platelets 348 140 - 450 10*3/mm3    Neutrophil % 74.9 42.7 - 76.0 %    Lymphocyte % 17.5 (L) 19.6 - 45.3 %    Monocyte % 6.2 5.0 - 12.0 %    Eosinophil % 0.5 0.3 - 6.2 %    Basophil % 0.4 0.0 - 1.5 %    Immature Grans % 0.5 0.0 - 0.5 %    Neutrophils, Absolute 7.17 (H) 1.70 - 7.00 10*3/mm3    Lymphocytes, Absolute 1.68 0.70 - 3.10 10*3/mm3    Monocytes, Absolute 0.59 0.10 - 0.90 10*3/mm3    Eosinophils, Absolute 0.05 0.00 - 0.40 10*3/mm3    Basophils, Absolute 0.04 0.00 - 0.20 10*3/mm3    Immature Grans, Absolute 0.05 0.00 - 0.05 10*3/mm3    nRBC 0.0 0.0 - 0.2 /100 WBC       Ordered the above labs and independently reviewed the results.        RADIOLOGY  No Radiology Exams Resulted Within Past 24 Hours    I ordered the above noted radiological studies. Reviewed by me and discussed with radiologist.  See dictation for official radiology interpretation.      PROCEDURES    Procedures      MEDICATIONS GIVEN IN ER    Medications   sodium chloride 0.9 % flush 10 mL (has no administration in time range)   sodium chloride 0.9 % bolus 1,000 mL (1,000 mL Intravenous New Bag 10/4/22 1621)   aluminum-magnesium hydroxide-simethicone (MAALOX MAX) 400-400-40 MG/5ML suspension 15 mL (15 mL Oral Given 10/4/22 1628)   Lidocaine Viscous HCl (XYLOCAINE) 2 % solution 15 mL (15 mL Mouth/Throat Given 10/4/22 1628)   ondansetron (ZOFRAN) injection 4 mg (4 mg Intravenous Given 10/4/22 1629)         PROGRESS, DATA ANALYSIS, CONSULTS, AND MEDICAL DECISION MAKING    Any/all labs have been independently reviewed by me.  Any/all radiology studies have been reviewed by me and discussed with radiologist dictating the report.   EKG's independently viewed and interpreted by  me.  Discussion below represents my analysis of pertinent findings related to patient's condition, differential diagnosis, treatment plan and final disposition.    Number of Diagnoses or Management Options     Amount and/or Complexity of Data Reviewed  Clinical lab tests: Yes  Tests in the radiology section of CPT®: Yes  Tests in the medicine section of CPT®: Yes  Review and summarize past medical records:  (Yes - see HPI)  Independent visualization of images, tracings, or specimens: (Yes - see below)      ED Course as of 10/04/22 1704   Tue Oct 04, 2022   1635 Abd Pain MDM includes but is not limited to: Cholecystitis, choledocholithiasis, cholangitis, peritonitis, pancreatitis/pseudocyst, peptic ulcer, bowel obstruction/ileus, constipation, diverticulitis/perforation/abscess, inflammatory bowel disease, renal colic/stone, urinary tract infection/pyelonephritis, prostatitis, mesenteric ischemia, expanding/leaking AAA, testicular/ovarian torsion. [WC]   1638 Ketones, UA(!): 15 mg/dL (1+) [WC]   1638 CBC is unremarkable. [WC]   1656 CMP, lipase and lactate are unremarkable. [WC]   1702 Discussed all lab and/or imaging with the patient.  The patient's abdominal exam has improved.  I explained that the patient should return to the emergency department should the pain recur or for any new symptoms (fever, blood in emesis/stool).  I also advised the patient to follow up with their PMD for further evaluation.  There is nothing on workup to suggest appendicitis, diverticulitis, cholecystitis, pancreatitis, peritonitis, mesenteric ischemia, ovarian/testicular torsion, ureteral stones or any other emergent intra-abdominal process.  My clinical suspicion for serious intra-abdominal pathology is low, and the patient can be safely discharged home for outpatient follow up.   [WC]      ED Course User Index  [WC] Terrance Ny MD       AS OF 17:04 EDT VITALS:    BP - 137/93  HR - 87  TEMP - 99.3 °F (37.4 °C) (Tympanic)  02  SATS - 97%        DIAGNOSIS  Final diagnoses:   Acute gastritis, presence of bleeding unspecified, unspecified gastritis type         DISPOSITION  Discharged          Note Disclaimer: At [facility], we believe that sharing information builds trust and better relationships. You are receiving this note because you recently visited [facility]. It is possible you will see health information before a provider has talked with you about it. This kind of information can be easy to misunderstand. To help you fully understand what it means for your health, we urge you to discuss this note with your provider.\         Terrance Ny MD  10/04/22 170

## 2022-10-12 ENCOUNTER — OFFICE VISIT (OUTPATIENT)
Dept: GASTROENTEROLOGY | Facility: CLINIC | Age: 52
End: 2022-10-12

## 2022-10-12 ENCOUNTER — PREP FOR SURGERY (OUTPATIENT)
Dept: SURGERY | Facility: SURGERY CENTER | Age: 52
End: 2022-10-12

## 2022-10-12 VITALS
OXYGEN SATURATION: 97 % | WEIGHT: 128.9 LBS | DIASTOLIC BLOOD PRESSURE: 78 MMHG | SYSTOLIC BLOOD PRESSURE: 108 MMHG | HEART RATE: 100 BPM | HEIGHT: 65 IN | TEMPERATURE: 98.4 F | BODY MASS INDEX: 21.48 KG/M2

## 2022-10-12 DIAGNOSIS — R11.0 NAUSEA: ICD-10-CM

## 2022-10-12 DIAGNOSIS — Z79.1 NSAID LONG-TERM USE: ICD-10-CM

## 2022-10-12 DIAGNOSIS — K92.1 COMPLAINT OF MELENA: ICD-10-CM

## 2022-10-12 DIAGNOSIS — R10.13 EPIGASTRIC PAIN: Primary | ICD-10-CM

## 2022-10-12 DIAGNOSIS — R93.3 ABNORMAL CT SCAN, STOMACH: ICD-10-CM

## 2022-10-12 DIAGNOSIS — K62.5 RECTAL BLEEDING: ICD-10-CM

## 2022-10-12 PROCEDURE — 99204 OFFICE O/P NEW MOD 45 MIN: CPT | Performed by: NURSE PRACTITIONER

## 2022-10-12 RX ORDER — SODIUM CHLORIDE 0.9 % (FLUSH) 0.9 %
3 SYRINGE (ML) INJECTION EVERY 12 HOURS SCHEDULED
Status: CANCELLED | OUTPATIENT
Start: 2022-10-12

## 2022-10-12 RX ORDER — SODIUM CHLORIDE, SODIUM LACTATE, POTASSIUM CHLORIDE, CALCIUM CHLORIDE 600; 310; 30; 20 MG/100ML; MG/100ML; MG/100ML; MG/100ML
30 INJECTION, SOLUTION INTRAVENOUS CONTINUOUS PRN
Status: CANCELLED | OUTPATIENT
Start: 2022-10-12

## 2022-10-12 RX ORDER — LORATADINE 10 MG/1
TABLET ORAL
COMMUNITY

## 2022-10-12 RX ORDER — OMEPRAZOLE 20 MG/1
20 CAPSULE, DELAYED RELEASE ORAL 2 TIMES DAILY
COMMUNITY
Start: 2022-08-12 | End: 2022-10-27

## 2022-10-12 RX ORDER — SODIUM CHLORIDE 0.9 % (FLUSH) 0.9 %
10 SYRINGE (ML) INJECTION AS NEEDED
Status: CANCELLED | OUTPATIENT
Start: 2022-10-12

## 2022-10-12 NOTE — PATIENT INSTRUCTIONS
Continue omeprazole 40 mg once daily.    Advance diet as tolerated, recommend increase caloric intake with smaller more frequent meals and bland food.    Continue sucralfate 4 pills/day.  Once symptoms improve and you are tolerating your typical diet, decrease to 3 pills/day.  Would like for you to continue on sucralfate 1 to 2 pills/day prior to upcoming EGD.    Avoid ALL NSAIDS    Start Dulcolax 2 pills daily to help promote more complete and regular bowel movements.  If this does not provide improvement, For constipation, recommend starting MiraLAX. This is available over-the-counter and generic brand is fine.  MiraLAX works best when taken on a regular basis (daily or every other day) to help promote more regular bowel movements.  Typically patients start with 1/2 -1 capful mixed in 8 ounces of noncarbonated liquid and take once or twice daily.  Maximum is 2 full capfuls daily.  Adjust dose based on your response.       Start over-the-counter calcium citrate.  Would recommend 1.2 g of calcium approximately per day.  This can be obtained through dietary sources or supplement in addition to dietary sources.    If you choose to take a supplement it is important that you only take Calcium CITRATE products as the other option calcium options over-the-counter such as calcium carbonate are not well absorbed by patients taking PPI therapy.    Calcium citrate products can be purchased over-the-counter.  Calcium citrate 200 to 250 mgs can be purchased over-the-counter.    I would recommend 1 pill twice daily in addition to increased dietary calcium.      If you have a return of rectal bleeding or change in symptoms prior to EGD and colonoscopy, please contact the office.    Recommend follow-up visit 2 weeks after EGD and colonoscopy have been performed.    Please contact the office with any questions or concerns otherwise additional recommendations will be based on EGD, colonoscopy results and clinical course.

## 2022-10-12 NOTE — PROGRESS NOTES
Chief Complaint   Patient presents with   • Abdominal Pain     Epigastric area, rectal bleeding, melena           History of Present Illness  51-year-old female presents today for consult after emergency room visit.  Her  is with her during today's visit.  She has a history of breast cancer status post bilateral mastectomy, constipation and presumed gastritis diagnosed on 2 previous ER visit November 2021 and most recently October 4, 2022.  She is a new patient with our practice referred to this office and Dr. Ruiz.    She presents today for follow-up after emergency department visit where she presented with epigastric pain, nausea, vomiting.  Pain located in epigastric area described as burning and constant.  Also nausea and vomiting.  Also bright red blood per rectum as well as dark tarry stools.    She has had 2 prior episodes of rectal bleeding described as bright red when she was straining with a bowel movement.  Blood per rectum was present for 10 days then spontaneously resolved.  Last colonoscopy 2019 with Dr Witt.     He is currently having a bowel movement 2 times per week.  She has associated bloating and abdominal pain in between bowel movements.  She will strain to have a bowel movement and has rectal discomfort.    previously treated with Linzess and Amitiza.  Most recently was on Amitiza 2 years ago. Approximately 2 years ago bowel movements spontaneously improved however she has had worsening constipation over the past several months.    She has also used Dulcolax at times in the past, she is not currently taking anything regularly to help regulate bowel movements.    After emergency room visit November 2021 where she was diagnosed with presumed gastritis she was started on omeprazole.  She was able to discontinue omeprazole and was doing well until she presented to emergency department October 2022.    She reports previous gallbladder evaluation that was unremarkable May 2021.    Two weeks  "ago severe change in symptoms with pain epigastrric pain, vomiting that resulted in emergency department visit.  She was diagnosed with presumed gastritis and restarted omeprazole and sucralfate.  At today's visit she denies abdominal pain, nausea, vomiting.  She continues on omeprazole daily.    She takes Goody powder for headaches 2 per day on average. Recent sinus surgery woith Dr Hedrick, some improvement in headaches but they continue and she is taking Goody powder daily.  No prior neurology evaluation or consult.    History of appendectomy.    Previous EGD 2007.  Hernia repair 2009.  Last colonoscopy 2019 with Dr. Witt.     Review of Systems      Result Review : { Labs  Result Review  Imaging  Med Tab  Media :23}      ED with Terrance Ny MD (10/04/2022)   CT Abdomen Pelvis With Contrast (11/20/2021 20:00)   CBC & Differential (10/04/2022 16:20)   Comprehensive Metabolic Panel (10/04/2022 16:20)   Lipase (10/04/2022 16:20)             Vital Signs:   /78   Pulse 100   Temp 98.4 °F (36.9 °C)   Ht 165.1 cm (65\")   Wt 58.5 kg (128 lb 14.4 oz)   SpO2 97%   BMI 21.45 kg/m²     Body mass index is 21.45 kg/m².     Physical Exam  Vitals reviewed.   Constitutional:       General: She is not in acute distress.     Appearance: Normal appearance. She is normal weight. She is not ill-appearing.   Pulmonary:      Effort: Pulmonary effort is normal. No respiratory distress.   Abdominal:      General: Abdomen is flat. Bowel sounds are normal. There is no distension.      Palpations: Abdomen is soft. Abdomen is not rigid. There is no mass or pulsatile mass.      Tenderness: There is abdominal tenderness (epigastric pain). There is no guarding or rebound.   Neurological:      Mental Status: She is alert.       Assessment and Plan    Diagnoses and all orders for this visit:    1. Epigastric pain (Primary)    2. Complaint of melena    3. Rectal bleeding    4. NSAID long-term use    5. Nausea    6. Abnormal CT " scan, stomach    Reviewed urgency department records October 4, 2022 as well as November 20, 2021 with abnormal imaging of the stomach, mildly thickened distal stomach and proximal duodenum which may be underdistention versus PUD gastritis duodenitis.  Also moderate colonic stool burden which may be ileus constipation.    Would like to help regulate bowel movements and promote more regular and complete evacuation.  As patient has used local ax in the past, recommend restarting this currently and we can consider prescription medication or the addition of MiraLAX based on clinical course.    Discussed diagnosis of osteopenia and PPI use.  We will continue omeprazole 40 mg once daily at this time.  Calcium citrate was recommended.  Also high calcium diet.    It is recommended to avoid all NSAIDs and patient may need to discuss neurology consult or alternative therapy for daily headaches with primary care provider.  Suspicion for NSAID induced gastritis, gastric ulcer due to chronic use of GOODYS powder as discussed in detail with patient and her .  We will proceed with EGD and colonoscopy for endoscopic evaluation as well given melena and rectal bleeding.                Patient Instructions   Continue omeprazole 40 mg once daily.    Advance diet as tolerated, recommend increase caloric intake with smaller more frequent meals and bland food.    Continue sucralfate 4 pills/day.  Once symptoms improve and you are tolerating your typical diet, decrease to 3 pills/day.  Would like for you to continue on sucralfate 1 to 2 pills/day prior to upcoming EGD.    Avoid ALL NSAIDS    Start Dulcolax 2 pills daily to help promote more complete and regular bowel movements.  If this does not provide improvement, For constipation, recommend starting MiraLAX. This is available over-the-counter and generic brand is fine.  MiraLAX works best when taken on a regular basis (daily or every other day) to help promote more regular bowel  movements.  Typically patients start with 1/2 -1 capful mixed in 8 ounces of noncarbonated liquid and take once or twice daily.  Maximum is 2 full capfuls daily.  Adjust dose based on your response.       Start over-the-counter calcium citrate.  Would recommend 1.2 g of calcium approximately per day.  This can be obtained through dietary sources or supplement in addition to dietary sources.    If you choose to take a supplement it is important that you only take Calcium CITRATE products as the other option calcium options over-the-counter such as calcium carbonate are not well absorbed by patients taking PPI therapy.    Calcium citrate products can be purchased over-the-counter.  Calcium citrate 200 to 250 mgs can be purchased over-the-counter.    I would recommend 1 pill twice daily in addition to increased dietary calcium.      If you have a return of rectal bleeding or change in symptoms prior to EGD and colonoscopy, please contact the office.    Recommend follow-up visit 2 weeks after EGD and colonoscopy have been performed.    Please contact the office with any questions or concerns otherwise additional recommendations will be based on EGD, colonoscopy results and clinical course.            EMR Dragon/Transcription Disclaimer:  This document has been Dictated utilizing Dragon dictation.

## 2022-10-14 ENCOUNTER — PATIENT ROUNDING (BHMG ONLY) (OUTPATIENT)
Dept: GASTROENTEROLOGY | Facility: CLINIC | Age: 52
End: 2022-10-14

## 2022-10-14 NOTE — PROGRESS NOTES
October 14, 2022    Hello, may I speak with Jennie Pederson?    My name is DION Solorio      I am  with [facility]  [address] [address] [phone].    Before we get started may I verify your date of birth? 1970    I am calling to officially welcome you to our practice and ask about your recent visit. Is this a good time to talk?  yes    Tell me about your visit with us. What things went well? Patient reports everything went extremely well.        We're always looking for ways to make our patients' experiences even better. Do you have recommendations on ways we may improve?  no    Overall were you satisfied with your first visit to our practice? yes       I appreciate you taking the time to speak with me today. Is there anything else I can do for you? no      Thank you, and have a great day.

## 2022-10-26 ENCOUNTER — PATIENT MESSAGE (OUTPATIENT)
Dept: GASTROENTEROLOGY | Facility: CLINIC | Age: 52
End: 2022-10-26

## 2022-10-26 DIAGNOSIS — R93.3 ABNORMAL CT SCAN, STOMACH: ICD-10-CM

## 2022-10-26 DIAGNOSIS — R10.13 EPIGASTRIC PAIN: Primary | ICD-10-CM

## 2022-10-26 DIAGNOSIS — K92.1 COMPLAINT OF MELENA: ICD-10-CM

## 2022-10-27 RX ORDER — SUCRALFATE 1 G/1
1 TABLET ORAL 4 TIMES DAILY
Qty: 120 TABLET | Refills: 2 | Status: SHIPPED | OUTPATIENT
Start: 2022-10-27 | End: 2023-03-07 | Stop reason: SDUPTHER

## 2022-10-27 RX ORDER — OMEPRAZOLE 40 MG/1
40 CAPSULE, DELAYED RELEASE ORAL 2 TIMES DAILY
Qty: 60 CAPSULE | Refills: 4 | Status: SHIPPED | OUTPATIENT
Start: 2022-10-27 | End: 2023-01-26 | Stop reason: HOSPADM

## 2022-10-27 RX ORDER — PROMETHAZINE HYDROCHLORIDE 25 MG/1
25 TABLET ORAL EVERY 6 HOURS PRN
Qty: 45 TABLET | Refills: 2 | Status: SHIPPED | OUTPATIENT
Start: 2022-10-27

## 2022-10-27 NOTE — TELEPHONE ENCOUNTER
From: Jennie Pederson  To: NATALY Ramirez  Sent: 10/26/2022 1:41 PM EDT  Subject: Prescription refill    Hi  I need to get a refill for the following prescriptions:  Sucralfate 1gm -4x day  Omeprazole DR 40mg -2x day  Promethazine 25mg -as needed  Enough to get me to the scheduled appointment on Dec 8.  My pharmacist is [facility] of [address].  [phone]    Thank you so much.  Jennie Pederson  [phone]

## 2022-12-01 ENCOUNTER — TELEPHONE (OUTPATIENT)
Dept: GASTROENTEROLOGY | Facility: CLINIC | Age: 52
End: 2022-12-01

## 2022-12-01 NOTE — TELEPHONE ENCOUNTER
Please contact patient and let her know that prescription wise there is not anything that is comparable.  She could try to break the pills in half and take half a dose 3 or 4 times daily so that she still has coverage with the medication, this might work to bridge her make the pills last longer.    She can try over-the-counter Gaviscon liquid formula, taking as directed by the , to replace/substitute for sucralfate but there is not an equivalent prescription.  Thank you so much.  Dave

## 2022-12-01 NOTE — TELEPHONE ENCOUNTER
Patient has 1 week's worth of sucralfate left.  Her procedure has been moved to the end of January. She has called several pharmacies to obtain RX and ended up having to go to a pharmacy on [address] to get what they had in stock.  She's going to continue to try to find more sucralfate, but wants to know if there is anything else comparable that could be called in for her?  She says by taking it that's the only way she can eat.

## 2023-01-25 RX ORDER — MELATONIN
2000 DAILY
COMMUNITY

## 2023-01-25 NOTE — SIGNIFICANT NOTE
Education provided the Patient on the following:    - Nothing to Eat or Drink after MN the night before the procedure    - Avoid red/purple fluids while completing their bowel prep as ordered by physician  -Contact Gastrointerologist office for any questions about specific details regarding colon prep    -You will need to have someone drive you home after your colonoscopy and remain with you for 24 hours after the procedure  - The date of your procedure, your are welcome to have one visitor at bedside or remain within 10-15 minutes of [facility]  -Please wear warm socks when you arrive for your procedure  -Remove all jewelry and leave any valuables before arriving the day of your procedure (all will have to be removed before leaving preop)  -You will need to arrive at 0730 on 1/26/2023 for your procedure    -Feel free to contact us at: [phone] with any additional questions/concerns

## 2023-01-26 ENCOUNTER — ANESTHESIA EVENT (OUTPATIENT)
Dept: SURGERY | Facility: SURGERY CENTER | Age: 53
End: 2023-01-26
Payer: MEDICARE

## 2023-01-26 ENCOUNTER — ANESTHESIA (OUTPATIENT)
Dept: SURGERY | Facility: SURGERY CENTER | Age: 53
End: 2023-01-26
Payer: MEDICARE

## 2023-01-26 ENCOUNTER — HOSPITAL ENCOUNTER (OUTPATIENT)
Facility: SURGERY CENTER | Age: 53
Setting detail: HOSPITAL OUTPATIENT SURGERY
Discharge: HOME | End: 2023-01-26
Attending: INTERNAL MEDICINE | Admitting: INTERNAL MEDICINE
Payer: MEDICARE

## 2023-01-26 VITALS
DIASTOLIC BLOOD PRESSURE: 86 MMHG | WEIGHT: 135 LBS | BODY MASS INDEX: 22.49 KG/M2 | OXYGEN SATURATION: 98 % | RESPIRATION RATE: 16 BRPM | SYSTOLIC BLOOD PRESSURE: 134 MMHG | HEART RATE: 71 BPM | HEIGHT: 65 IN | TEMPERATURE: 97.8 F

## 2023-01-26 DIAGNOSIS — R11.0 NAUSEA: ICD-10-CM

## 2023-01-26 DIAGNOSIS — Z79.1 NSAID LONG-TERM USE: ICD-10-CM

## 2023-01-26 DIAGNOSIS — R93.3 ABNORMAL CT SCAN, STOMACH: ICD-10-CM

## 2023-01-26 DIAGNOSIS — K92.1 COMPLAINT OF MELENA: ICD-10-CM

## 2023-01-26 DIAGNOSIS — R10.13 EPIGASTRIC PAIN: ICD-10-CM

## 2023-01-26 DIAGNOSIS — K62.5 RECTAL BLEEDING: ICD-10-CM

## 2023-01-26 PROCEDURE — 43239 EGD BIOPSY SINGLE/MULTIPLE: CPT | Performed by: INTERNAL MEDICINE

## 2023-01-26 PROCEDURE — 45380 COLONOSCOPY AND BIOPSY: CPT | Performed by: INTERNAL MEDICINE

## 2023-01-26 PROCEDURE — 25010000002 PROPOFOL 10 MG/ML EMULSION: Performed by: ANESTHESIOLOGY

## 2023-01-26 PROCEDURE — 88305 TISSUE EXAM BY PATHOLOGIST: CPT | Performed by: INTERNAL MEDICINE

## 2023-01-26 PROCEDURE — 87081 CULTURE SCREEN ONLY: CPT | Performed by: INTERNAL MEDICINE

## 2023-01-26 RX ORDER — ONDANSETRON 2 MG/ML
4 INJECTION INTRAMUSCULAR; INTRAVENOUS ONCE AS NEEDED
Status: DISCONTINUED | OUTPATIENT
Start: 2023-01-26 | End: 2023-01-26 | Stop reason: HOSPADM

## 2023-01-26 RX ORDER — LIDOCAINE HYDROCHLORIDE 20 MG/ML
INJECTION, SOLUTION INFILTRATION; PERINEURAL AS NEEDED
Status: DISCONTINUED | OUTPATIENT
Start: 2023-01-26 | End: 2023-01-26 | Stop reason: SURG

## 2023-01-26 RX ORDER — SODIUM CHLORIDE 0.9 % (FLUSH) 0.9 %
3 SYRINGE (ML) INJECTION EVERY 12 HOURS SCHEDULED
Status: DISCONTINUED | OUTPATIENT
Start: 2023-01-26 | End: 2023-01-26 | Stop reason: HOSPADM

## 2023-01-26 RX ORDER — SODIUM CHLORIDE, SODIUM LACTATE, POTASSIUM CHLORIDE, CALCIUM CHLORIDE 600; 310; 30; 20 MG/100ML; MG/100ML; MG/100ML; MG/100ML
30 INJECTION, SOLUTION INTRAVENOUS CONTINUOUS PRN
Status: DISCONTINUED | OUTPATIENT
Start: 2023-01-26 | End: 2023-01-26 | Stop reason: HOSPADM

## 2023-01-26 RX ORDER — SODIUM CHLORIDE 0.9 % (FLUSH) 0.9 %
10 SYRINGE (ML) INJECTION AS NEEDED
Status: DISCONTINUED | OUTPATIENT
Start: 2023-01-26 | End: 2023-01-26 | Stop reason: HOSPADM

## 2023-01-26 RX ORDER — PANTOPRAZOLE SODIUM 40 MG/1
40 TABLET, DELAYED RELEASE ORAL DAILY
Qty: 30 TABLET | Refills: 5 | Status: SHIPPED | OUTPATIENT
Start: 2023-01-26

## 2023-01-26 RX ORDER — PROPOFOL 10 MG/ML
VIAL (ML) INTRAVENOUS AS NEEDED
Status: DISCONTINUED | OUTPATIENT
Start: 2023-01-26 | End: 2023-01-26 | Stop reason: SURG

## 2023-01-26 RX ORDER — FENTANYL CITRATE 50 UG/ML
25 INJECTION, SOLUTION INTRAMUSCULAR; INTRAVENOUS
Status: DISCONTINUED | OUTPATIENT
Start: 2023-01-26 | End: 2023-01-26 | Stop reason: HOSPADM

## 2023-01-26 RX ORDER — MAGNESIUM HYDROXIDE 1200 MG/15ML
LIQUID ORAL AS NEEDED
Status: DISCONTINUED | OUTPATIENT
Start: 2023-01-26 | End: 2023-01-26 | Stop reason: HOSPADM

## 2023-01-26 RX ADMIN — PROPOFOL 100 MG: 10 INJECTION, EMULSION INTRAVENOUS at 08:48

## 2023-01-26 RX ADMIN — PROPOFOL 200 MCG/KG/MIN: 10 INJECTION, EMULSION INTRAVENOUS at 08:50

## 2023-01-26 RX ADMIN — LIDOCAINE HYDROCHLORIDE 50 MG: 20 INJECTION, SOLUTION INFILTRATION; PERINEURAL at 08:48

## 2023-01-26 RX ADMIN — SODIUM CHLORIDE, POTASSIUM CHLORIDE, SODIUM LACTATE AND CALCIUM CHLORIDE 30 ML/HR: 600; 310; 30; 20 INJECTION, SOLUTION INTRAVENOUS at 08:07

## 2023-01-26 NOTE — ANESTHESIA PREPROCEDURE EVALUATION
Anesthesia Evaluation     NPO Solid Status: > 8 hours             Airway   Mallampati: II  TM distance: >3 FB  Neck ROM: full  Dental - normal exam     Pulmonary - normal exam   Cardiovascular - normal exam    (-) hypertension, past MI      Neuro/Psych  GI/Hepatic/Renal/Endo    (+)  GI bleeding ,     Musculoskeletal     Abdominal    Substance History      OB/GYN          Other                        Anesthesia Plan    ASA 2     MAC       Anesthetic plan, risks, benefits, and alternatives have been provided, discussed and informed consent has been obtained with: patient.        CODE STATUS:

## 2023-01-26 NOTE — H&P
No chief complaint on file.      HPI  gerd  Nausea  Melena  abnormal ct         Problem List:    Patient Active Problem List   Diagnosis   • Epigastric pain   • Complaint of melena   • NSAID long-term use   • Rectal bleeding   • Nausea   • Abnormal CT scan, stomach       Medical History:    Past Medical History:   Diagnosis Date   • Cancer (HCC) 2014 & 2021    Breast cancer- DMX   • Colon polyp 2019   • Gastritis 2021   • Hernia 2009    3 hernias   • Irritable bowel syndrome 2009   • Rectal bleeding 10/12/2022        Social History:    Social History     Socioeconomic History   • Marital status:    Tobacco Use   • Smoking status: Never   • Smokeless tobacco: Never   Vaping Use   • Vaping Use: Never used   Substance and Sexual Activity   • Alcohol use: Yes     Comment: Maybe 3 glasses a month   • Drug use: Never   • Sexual activity: Yes     Partners: Male     Birth control/protection: Post-menopausal       Family History:   Family History   Problem Relation Age of Onset   • Colon polyps Father    • Colon cancer Neg Hx    • Crohn's disease Neg Hx    • Irritable bowel syndrome Neg Hx    • Ulcerative colitis Neg Hx        Surgical History:   Past Surgical History:   Procedure Laterality Date   • ABDOMINAL SURGERY  2009    Laparoscopic (x2)   • APPENDECTOMY  2009   • BREAST RECONSTRUCTION Bilateral     2022   • COLONOSCOPY  2019   • HERNIA REPAIR  2009    3 hernias treated   • MASTECTOMY Bilateral 2021   • UPPER GASTROINTESTINAL ENDOSCOPY  2007       No current facility-administered medications for this encounter.    Allergies:   Allergies   Allergen Reactions   • Celecoxib Other (See Comments)   • Lyrica [Pregabalin] Other (See Comments)     nightmares   • Bactrim [Sulfamethoxazole-Trimethoprim] Rash        The following portions of the patient's history were reviewed by me and updated as appropriate: review of systems, allergies, current medications, past family history, past medical history, past social  history, past surgical history and problem list.    There were no vitals filed for this visit.    PHYSICAL EXAM:    CONSTITUTIONAL:  today's vital signs reviewed by me  GASTROINTESTINAL: abdomen is soft nontender nondistended with normal active bowel sounds, no masses are appreciated    Assessment/ Plan  gerd  Nausea  Melena  abnormal ct    egd and colonosocpy    Risks and benefits as well as alternatives to endoscopic evaluation were explained to the patient and they voiced understanding and wish to proceed.  These risks include but are not limited to the risk of bleeding, perforation, adverse reaction to sedation, and missed lesions.  The patient was given the opportunity to ask questions prior to the endoscopic procedure.

## 2023-01-26 NOTE — ANESTHESIA POSTPROCEDURE EVALUATION
Patient: Jennie Pederson    Procedure Summary     Date: 01/26/23 Room / Location: [facility]  [facility]    Anesthesia Start: 0844 Anesthesia Stop: 0922    Procedures:       ESOPHAGOGASTRODUODENOSCOPY WITH BIOPSY      COLONOSCOPY WITH POLYPECTOMY Diagnosis:       Epigastric pain      Complaint of melena      NSAID long-term use      Rectal bleeding      Nausea      Abnormal CT scan, stomach      (Epigastric pain [R10.13])      (Complaint of melena [K92.1])      (NSAID long-term use [Z79.1])      (Rectal bleeding [K62.5])      (Nausea [R11.0])      (Abnormal CT scan, stomach [R93.3])    Surgeons: En Ruiz MD Provider: Chris Ness MD    Anesthesia Type: MAC ASA Status: 2          Anesthesia Type: MAC    Vitals  Vitals Value Taken Time   /86 01/26/23 0939   Temp 36.6 °C (97.8 °F) 01/26/23 0922   Pulse 71 01/26/23 0939   Resp 16 01/26/23 0939   SpO2 98 % 01/26/23 0939           Post Anesthesia Care and Evaluation    Patient location during evaluation: bedside  Pain management: adequate    Airway patency: patent  Anesthetic complications: No anesthetic complications    Cardiovascular status: acceptable  Respiratory status: acceptable  Hydration status: acceptable

## 2023-01-27 LAB
LAB AP CASE REPORT: NORMAL
LAB AP CLINICAL INFORMATION: NORMAL
LAB AP DIAGNOSIS COMMENT: NORMAL
PATH REPORT.FINAL DX SPEC: NORMAL
PATH REPORT.GROSS SPEC: NORMAL
UREASE TISS QL: NEGATIVE

## 2023-03-07 DIAGNOSIS — K92.1 COMPLAINT OF MELENA: ICD-10-CM

## 2023-03-07 DIAGNOSIS — R10.13 EPIGASTRIC PAIN: ICD-10-CM

## 2023-03-07 DIAGNOSIS — R93.3 ABNORMAL CT SCAN, STOMACH: ICD-10-CM

## 2023-03-07 RX ORDER — SUCRALFATE 1 G/1
1 TABLET ORAL 4 TIMES DAILY
Qty: 120 TABLET | Refills: 2 | Status: SHIPPED | OUTPATIENT
Start: 2023-03-07

## 2023-03-13 ENCOUNTER — OFFICE VISIT (OUTPATIENT)
Dept: GASTROENTEROLOGY | Facility: CLINIC | Age: 53
End: 2023-03-13
Payer: MEDICARE

## 2023-03-13 VITALS
SYSTOLIC BLOOD PRESSURE: 128 MMHG | TEMPERATURE: 96.9 F | HEART RATE: 88 BPM | HEIGHT: 65 IN | BODY MASS INDEX: 22.71 KG/M2 | OXYGEN SATURATION: 98 % | WEIGHT: 136.3 LBS | DIASTOLIC BLOOD PRESSURE: 74 MMHG

## 2023-03-13 DIAGNOSIS — R10.13 EPIGASTRIC PAIN: ICD-10-CM

## 2023-03-13 DIAGNOSIS — K22.70 BARRETT'S ESOPHAGUS WITHOUT DYSPLASIA: ICD-10-CM

## 2023-03-13 DIAGNOSIS — Z79.1 NSAID LONG-TERM USE: Primary | ICD-10-CM

## 2023-03-13 PROCEDURE — 99214 OFFICE O/P EST MOD 30 MIN: CPT

## 2023-03-13 NOTE — PROGRESS NOTES
Chief Complaint   Patient presents with   • Follow-up     Noonan's esophagus and constipation           History of Present Illness    Patient is a 52 y.o. who presents to the office for follow up evaluation.  Last in office visit was on 10/12/2022. Patient has a significant past medical history of  breast cancer status post bilateral mastectomy, constipation.     On 1/26/2023 patient underwent EGD and colonoscopy evaluation with Dr. Ruiz and remarkable for:    - Z-line irregular, at the gastroesophageal junction. Biopsied.  - Gastritis. Biopsied.  - Normal examined duodenum. Biopsied.  - The examination was otherwise normal.    Biopsies revealed Noonan's esophagus characterized by mild chronic inflammation without significant eosinophils (5 per 40HPF) with focal intestinal metaplasia without dysplasia or malignancy.      Tissue sampling of the gastric antrum revealed mild chronic inflammatory changes suggestive of chemical gastropathy-bile reflux noted    Next EGD recommended in 12 months for surveillance of new diagnosis of Noonan's esophagus.  Minimize to abstain from NSAID use.    Negative for H. pylori or celiac disease.    Colonoscopy remarkable for:    - Diverticulosis in the sigmoid colon.  - Two 2 to 3 mm hyperplastic polyps in the rectum  - Stool in the entire examined colon.  - The distal rectum and anal verge are normal on retroflexion view.  - Non-bleeding internal hemorrhoids.  - The examination was otherwise normal.    Next colonoscopy recommended in 10 years due January 2033.    Patient presents for follow-up discussion after undergoing endoscopic evaluation with Dr. Ruiz.  For GERD, she continues on pantoprazole 40 mg once daily approximately 30 to 60 minutes prior to breakfast.  Describes symptoms as overall well controlled without heartburn, nausea, vomiting, or dysphagia.    States that she will intermittently experience epigastric abdominal pain with eructation however this is improved  "since starting pantoprazole regimen.  She also continues to take Goody's headache powder regularly for headaches.    For constipation, states that she has been taking magnesium supplementation nightly and will have approximately 3 bowel movements per week.  Denies melena, hematochezia, or mucus.    Denies starting MiraLAX or Dulcolax at this time.  Weight is stable.    Patient has positive family history of thyroid cancer    Patient denies known family history of colon polyps, colon cancer, or IBD.       Result Review :       Office Visit with Yumiko Lares APRN (10/12/2022)  UPPER GI ENDOSCOPY (01/26/2023 08:38)  COLONOSCOPY (01/26/2023 08:36)  Tissue Pathology Exam (01/26/2023 08:52)  Urease For H Pylori - Tissue, Gastric, Antrum (01/26/2023 08:55)  NM Hida, W CCK or Pharm Intervention t (05/03/2021 16:31)- 84 %   CT Abdomen & Pelvis W IV Contrast Oral Contrast if Indicated by Radiology (09/23/2021 12:28)        Vital Signs:   /74   Pulse 88   Temp 96.9 °F (36.1 °C)   Ht 165.1 cm (65\")   Wt 61.8 kg (136 lb 4.8 oz)   SpO2 98%   BMI 22.68 kg/m²     Body mass index is 22.68 kg/m².     Physical Exam  Vitals reviewed.   Constitutional:       General: She is not in acute distress.     Appearance: She is well-developed.   HENT:      Head: Normocephalic and atraumatic.   Pulmonary:      Effort: Pulmonary effort is normal. No respiratory distress.   Abdominal:      General: Abdomen is flat. Bowel sounds are normal. There is no distension.      Palpations: Abdomen is soft. There is no mass.      Tenderness: There is no abdominal tenderness. There is no guarding or rebound.      Hernia: No hernia is present.   Skin:     General: Skin is dry.      Coloration: Skin is not pale.   Neurological:      Mental Status: She is alert and oriented to person, place, and time.   Psychiatric:         Thought Content: Thought content normal.           Assessment and Plan    Diagnoses and all orders for this visit:    1. " NSAID long-term use (Primary)    2. Noonan's esophagus without dysplasia    3. Epigastric pain           Discussion:    Patient presents today for follow-up discussion after undergoing EGD and colonoscopy evaluation with Dr. Ruiz.  Reviewed results of endoscopic evaluation at length with patient.      Recommend continuing on pantoprazole 40 mg once daily prior to breakfast and sucralfate 1 g as needed.  Lengthy discussion provided on importance of seeking alternative analgesia for headaches then Goody's supplementation due to high aspirin/nonsteroidal component to medication.    Encouraged her to follow-up with her primary care provider for additional recommendations and/or Tylenol/low-dose NSAID dosing as needed.    Additionally, reinforced recommendation to undergo EGD for  surveillance of Noonan's esophagus in 12 months or January 2024. Further recommendations to be made pending results of the above work-up and clinical course.    Next colonoscopy for colon cancer screening recommended in 10 years (1/2033).    Patient is agreeable to the outlined above treatment plan.  Verbalizes understanding and will contact office for any new or worsening concerns.  All questions answered and support provided.        Patient Instructions   For GERD:    Begin taking Pantoprazole 40 mg once a day 30-60 minutes prior to breakfast    Can continue sucralfate 1 g as needed     For constipation:    We recommend starting MiraLAX. This is available over-the-counter and generic brand is fine.    MiraLAX works best when taken on a regular basis (daily or every other day) to help promote more regular bowel movements.    Typically patients start with 1/2 -1 capful mixed in 8 ounces of noncarbonated liquid and take once or twice daily.    Maximum is 2 full capfuls daily.  Adjust dose based on your response.        We recommend starting a daily fiber supplement such as FiberCon - once tablet daily     These can be purchased  over-the-counter, generic brand is fine.  Fiber supplements can take 12 to 72 hours to start working. Make sure to drink 6 to 12 ounces of water or noncarbonated beverage with fiber supplement.     Recommended starting with half of product listed daily dose for 7 days to help reduce risk of abdominal bloating/gas and allow your body to acclimate to fiber diet intake.  If you do not experience any of these adverse effects may increase to daily dose listed on product.                            EMR Dragon/Transcription Disclaimer:  This document has been Dictated utilizing Dragon dictation.

## 2023-05-08 DIAGNOSIS — R10.13 EPIGASTRIC PAIN: ICD-10-CM

## 2023-05-08 DIAGNOSIS — R93.3 ABNORMAL CT SCAN, STOMACH: ICD-10-CM

## 2023-05-08 DIAGNOSIS — K92.1 COMPLAINT OF MELENA: ICD-10-CM

## 2023-05-08 RX ORDER — PROMETHAZINE HYDROCHLORIDE 25 MG/1
TABLET ORAL
Qty: 45 TABLET | Refills: 0 | Status: SHIPPED | OUTPATIENT
Start: 2023-05-08

## 2023-06-06 DIAGNOSIS — R10.13 EPIGASTRIC PAIN: ICD-10-CM

## 2023-06-06 DIAGNOSIS — K92.1 COMPLAINT OF MELENA: ICD-10-CM

## 2023-06-06 DIAGNOSIS — R93.3 ABNORMAL CT SCAN, STOMACH: ICD-10-CM

## 2023-06-06 RX ORDER — SUCRALFATE 1 G/1
TABLET ORAL
Qty: 120 TABLET | Refills: 0 | Status: SHIPPED | OUTPATIENT
Start: 2023-06-06

## 2023-06-07 RX ORDER — PROMETHAZINE HYDROCHLORIDE 25 MG/1
TABLET ORAL
Qty: 45 TABLET | Refills: 0 | Status: SHIPPED | OUTPATIENT
Start: 2023-06-07

## 2023-09-06 ENCOUNTER — OFFICE VISIT (OUTPATIENT)
Dept: GASTROENTEROLOGY | Facility: CLINIC | Age: 53
End: 2023-09-06
Payer: MEDICARE

## 2023-09-06 VITALS
DIASTOLIC BLOOD PRESSURE: 74 MMHG | OXYGEN SATURATION: 98 % | HEIGHT: 65 IN | SYSTOLIC BLOOD PRESSURE: 118 MMHG | WEIGHT: 144.6 LBS | HEART RATE: 99 BPM | TEMPERATURE: 97.1 F | BODY MASS INDEX: 24.09 KG/M2

## 2023-09-06 DIAGNOSIS — R14.0 ABDOMINAL BLOATING: ICD-10-CM

## 2023-09-06 DIAGNOSIS — K22.70 BARRETT'S ESOPHAGUS WITHOUT DYSPLASIA: Primary | ICD-10-CM

## 2023-09-06 DIAGNOSIS — K58.1 IRRITABLE BOWEL SYNDROME WITH CONSTIPATION: ICD-10-CM

## 2023-09-06 NOTE — PATIENT INSTRUCTIONS
For GERD:    Follow antireflux precautions:    Continue 40 mg once daily prior to betime   Avoiding eating within 3 to 4 hours of bedtime.    Avoid foods that can trigger symptoms which may include:  citrus fruits  spicy foods,  Tomatoes  Red sauces   Chocolate  coffee/tea  caffeinated or carbonated beverages  Alcohol      For nausea, bloating and fullness     Traci has been helpful for some patients.  Gingerroot capsules can be purchased over-the-counter.    Directions: Traic root 500 mg (or 550 mg) capsules, take 1 to 2 capsules 3 times daily before meals, max 6 capsules per day.    Gastroparesis is a disorder in which the stomach takes too long to empty its food contents into the small intestine.  This can result in symptoms of acid reflux, lack of appetite, nausea, vomiting, abdominal discomfort, bloating or fullness.  The purpose of the diet for gastroparesis is to reduce symptoms and maintain adequate nutrition.  The symptoms of gastroparesis may be mild or severe depending on the person and symptoms can come and go.  Changing your eating habits can help control gastroparesis.    General diet guidelines to help reduce symptoms associated with Gastroparesis     1.  Eat small frequent meals.  Many people find that frequent small meals 5-6 meals each day produce fewer symptoms than large meals.    2.  Liquids are often better tolerated than solids with active symptoms.    Begin with a liquid or puréed diet which may be better tolerated.  Liquids can pass through the stomach more easily and quickly than solids.  Try sipping on liquids throughout the day.  Liquid nutritional supplements may help achieve adequate calories and protein.  Advance your diet to soft foods as symptoms improve.  Also remember to chew your food well.    3.  Reduce fat intake.  Fat naturally slows digestion however it is better tolerated if present in liquid form when symptoms are active.  Avoid all fried, fatty or greasy foods if they  cause active symptoms.    4.  Reduce fiber intake.  Fiber also slows digestion.  Avoid all raw vegetables and fruits and whole grain products if symptoms are active or fiber has been known to cause your symptoms.    5.  Hyperglycemia (high blood sugar).  Keep your blood sugar under control as drastic changes in blood sugar can impair gastric emptying.    For Constipation:    Prescription for IBSRela 50 mg twice daily tablet has been sent to a specialty pharmacy, [facility], located in [place], [place].    The pharmacy will text or call the patient to obtain their consent.    They will initiate a required prior authorization if needed.  If there is a delay in coverage determination by the patient's insurance, the patient will be eligible to receive a 1 month supply of the medication at no cost.  Once approved by insurance [facility] will connect with patient and arrange for medication to be received.  If there are any potential affordability or access issues, [facility] has multiple programs designed to assist patients in gaining access to IBSRela.    [facility] Phone number to help with access and or affordability support: [phone] option 1        Please contact our office via [employer] or Telephone call to update us on your response to the above treatment plan in 4 weeks

## 2023-09-06 NOTE — PROGRESS NOTES
No chief complaint on file.          History of Present Illness    Patient is a 52 y.o. who presents to the office for follow up evaluation.  Last in office visit was on 3/13/2023. Patient has a significant past medical history of Noonan's esophagus first diagnosed on 1/26/2023 EGD evaluation, breast cancer status post bilateral mastectomy, constipation.      On 1/26/2023 patient underwent EGD and colonoscopy evaluation with Dr. Ruiz and remarkable for:     - Z-line irregular, at the gastroesophageal junction. Biopsied.  - Gastritis. Biopsied.  - Normal examined duodenum. Biopsied.  - The examination was otherwise normal.     Biopsies revealed Noonan's esophagus characterized by mild chronic inflammation without significant eosinophils (5 per 40HPF) with focal intestinal metaplasia without dysplasia or malignancy.       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  Minimize to abstain from NSAID use.    Negative for H. pylori or celiac disease.     Colonoscopy remarkable for:     - Diverticulosis in the sigmoid colon.  - Two 2 to 3 mm hyperplastic polyps in the rectum  - Stool in the entire examined colon.  - The distal rectum and anal verge are normal on retroflexion view.  - Non-bleeding internal hemorrhoids.  - The examination was otherwise normal.     Next colonoscopy recommended in 10 years due January 2033.    For GERD, She continues taking pantoprazole 40 mg once daily prior to breakfast and sucralfate 1 g up to 3 times a day as needed and describes upper GI symptoms as improved however does report over the past month she has been consuming more meals outside her home which has caused worsening of nausea without vomiting.    She also reports early satiety and postprandial bloating without known specific dietary  "trigger.    She denies current use of MiraLAX and describes bowel habits as occurring every 2 to 3 days with sensation of incomplete evacuation of stool.  Denies presence of melena or hematochezia.    Patient has positive family history of thyroid cancer     Patient denies known family history of colon polyps, colon cancer, or IBD.       Result Review :         Office Visit with Irene Barajas APRN (03/13/2023)     Vital Signs:   /74   Pulse 99   Temp 97.1 °F (36.2 °C)   Ht 165.1 cm (65\")   Wt 65.6 kg (144 lb 9.6 oz)   SpO2 98%   BMI 24.06 kg/m²     Body mass index is 24.06 kg/m².     Physical Exam  Vitals reviewed.   Constitutional:       General: She is not in acute distress.     Appearance: Normal appearance. She is not ill-appearing.   Eyes:      General: No scleral icterus.  Pulmonary:      Effort: Pulmonary effort is normal. No respiratory distress.   Abdominal:      General: Bowel sounds are normal. There is no distension.      Palpations: Abdomen is soft. Abdomen is not rigid. There is no mass or pulsatile mass.      Tenderness: There is no abdominal tenderness. There is no guarding or rebound.      Hernia: No hernia is present.   Skin:     Coloration: Skin is not jaundiced.   Neurological:      Mental Status: She is alert and oriented to person, place, and time.   Psychiatric:         Thought Content: Thought content normal.         Judgment: Judgment normal.         Assessment and Plan    Diagnoses and all orders for this visit:    1. Noonan's esophagus without dysplasia (Primary)    2. Irritable bowel syndrome with constipation  -     Tenapanor HCl 50 MG tablet; Take 1 tablet by mouth 2 (Two) Times a Day With Meals.  Dispense: 60 tablet; Refill: 3    3. Abdominal bloating           Discussion:    Patient presents for follow-up evaluation of GERD.  For GERD, recommend continuing daily pantoprazole 40 mg prior to breakfast and sucralfate up to 3 times daily as needed.  Congratulated patient " on discontinuing NSAID use.  For early satiety recommend consuming smaller more frequent meals that are easy to digest and begin gingerroot supplementation to help with postprandial nausea and bloating.    We will plan to perform surveillance endoscopic evaluation for Noonan's in January 2024 with recall active in EMR.    For constipation, will also start patient on Ibsrela 50 mg twice daily prior to breakfast and dinner.  Completed discussion with patient on mechanism of action of medication and common side effects including diarrhea.  Samples provided at conclusion of today's visit as well as prescription submitted to patient's pharmacy.  She will provide a 4-week update on symptom response to updated treatment regimen.    Further recommendations to be made pending results of the above work-up and clinical course.Patient is agreeable to the outlined above treatment plan.  Verbalizes understanding and will contact office for any new or worsening concerns.  All questions answered and support provided.        Patient Instructions   For GERD:    Follow antireflux precautions:    Continue 40 mg once daily prior to betime   Avoiding eating within 3 to 4 hours of bedtime.    Avoid foods that can trigger symptoms which may include:  citrus fruits  spicy foods,  Tomatoes  Red sauces   Chocolate  coffee/tea  caffeinated or carbonated beverages  Alcohol      For nausea, bloating and fullness     Traci has been helpful for some patients.  Gingerroot capsules can be purchased over-the-counter.    Directions: Traci root 500 mg (or 550 mg) capsules, take 1 to 2 capsules 3 times daily before meals, max 6 capsules per day.    Gastroparesis is a disorder in which the stomach takes too long to empty its food contents into the small intestine.  This can result in symptoms of acid reflux, lack of appetite, nausea, vomiting, abdominal discomfort, bloating or fullness.  The purpose of the diet for gastroparesis is to reduce symptoms  and maintain adequate nutrition.  The symptoms of gastroparesis may be mild or severe depending on the person and symptoms can come and go.  Changing your eating habits can help control gastroparesis.    General diet guidelines to help reduce symptoms associated with Gastroparesis     1.  Eat small frequent meals.  Many people find that frequent small meals 5-6 meals each day produce fewer symptoms than large meals.    2.  Liquids are often better tolerated than solids with active symptoms.    Begin with a liquid or puréed diet which may be better tolerated.  Liquids can pass through the stomach more easily and quickly than solids.  Try sipping on liquids throughout the day.  Liquid nutritional supplements may help achieve adequate calories and protein.  Advance your diet to soft foods as symptoms improve.  Also remember to chew your food well.    3.  Reduce fat intake.  Fat naturally slows digestion however it is better tolerated if present in liquid form when symptoms are active.  Avoid all fried, fatty or greasy foods if they cause active symptoms.    4.  Reduce fiber intake.  Fiber also slows digestion.  Avoid all raw vegetables and fruits and whole grain products if symptoms are active or fiber has been known to cause your symptoms.    5.  Hyperglycemia (high blood sugar).  Keep your blood sugar under control as drastic changes in blood sugar can impair gastric emptying.    For Constipation:    Prescription for IBSRela 50 mg twice daily tablet has been sent to a specialty pharmacy, [facility], located in [place], [place].    The pharmacy will text or call the patient to obtain their consent.    They will initiate a required prior authorization if needed.  If there is a delay in coverage determination by the patient's insurance, the patient will be eligible to receive a 1 month supply of the medication at no cost.  Once approved by insurance [facility] will connect with patient and arrange for medication  to be received.  If there are any potential affordability or access issues, [facility] has multiple programs designed to assist patients in gaining access to IBSRela.    [facility] Phone number to help with access and or affordability support: [phone] option 1        Please contact our office via [employer] or Telephone call to update us on your response to the above treatment plan in 4 weeks          EMR Dragon/Transcription Disclaimer:  This document has been Dictated utilizing Dragon dictation.

## 2023-09-08 ENCOUNTER — TELEPHONE (OUTPATIENT)
Dept: GASTROENTEROLOGY | Facility: CLINIC | Age: 53
End: 2023-09-08
Payer: MEDICARE

## 2023-09-08 DIAGNOSIS — R93.3 ABNORMAL CT SCAN, STOMACH: ICD-10-CM

## 2023-09-08 DIAGNOSIS — R10.13 EPIGASTRIC PAIN: ICD-10-CM

## 2023-09-08 DIAGNOSIS — K92.1 COMPLAINT OF MELENA: ICD-10-CM

## 2023-09-08 DIAGNOSIS — K58.1 IRRITABLE BOWEL SYNDROME WITH CONSTIPATION: ICD-10-CM

## 2023-09-08 NOTE — TELEPHONE ENCOUNTER
[place] for Ibsrela was denied.    REASON:Denied. This drug is not covered on the formulary. We are denying your request because we do not show that you have tried at least 2 covered drugs that can treat your condition. You have already tried Linzess. Other covered drug(s) is/are: Lubiprostone oral capsule (8 mcg, 24 mcg).

## 2023-09-11 RX ORDER — SUCRALFATE 1 G/1
TABLET ORAL
Qty: 120 TABLET | Refills: 0 | Status: SHIPPED | OUTPATIENT
Start: 2023-09-11

## 2023-09-12 ENCOUNTER — TELEPHONE (OUTPATIENT)
Dept: GASTROENTEROLOGY | Facility: CLINIC | Age: 53
End: 2023-09-12
Payer: MEDICARE

## 2023-09-12 NOTE — TELEPHONE ENCOUNTER
[facility] needs additional information for patient prior to IBSrela auth.  Needs to know if patient has been prescribed lubiprostone 8 mcg or 24 mcg at any other time.  Checked chart - not seen on med list.      [phone]  Case#[id number]

## 2023-09-13 ENCOUNTER — TELEPHONE (OUTPATIENT)
Dept: GASTROENTEROLOGY | Facility: CLINIC | Age: 53
End: 2023-09-13
Payer: MEDICARE

## 2023-09-14 ENCOUNTER — TELEPHONE (OUTPATIENT)
Dept: GASTROENTEROLOGY | Facility: CLINIC | Age: 53
End: 2023-09-14

## 2023-09-14 NOTE — TELEPHONE ENCOUNTER
"    Hub staff attempted to follow warm transfer process and was unsuccessful     Caller: [facility] - [place] - [address] - [phone]  - [phone] FX    Relationship to patient: [facility] - WORK WITH [facility]    Best call back number: [phone] CASE# [id number]    Patient is needing: PRIOR AUTH LOOKS LIKE ITS COMPLETED BUT NOT \"SENT\" PLEASE FOLLOW UP.         "

## 2023-09-18 DIAGNOSIS — R93.3 ABNORMAL CT SCAN, STOMACH: ICD-10-CM

## 2023-09-18 DIAGNOSIS — K92.1 COMPLAINT OF MELENA: ICD-10-CM

## 2023-09-18 DIAGNOSIS — R10.13 EPIGASTRIC PAIN: ICD-10-CM

## 2023-09-18 RX ORDER — PROMETHAZINE HYDROCHLORIDE 25 MG/1
TABLET ORAL
Qty: 45 TABLET | Refills: 0 | Status: SHIPPED | OUTPATIENT
Start: 2023-09-18

## 2023-10-04 DIAGNOSIS — R93.3 ABNORMAL CT SCAN, STOMACH: ICD-10-CM

## 2023-10-04 DIAGNOSIS — K92.1 COMPLAINT OF MELENA: ICD-10-CM

## 2023-10-04 DIAGNOSIS — R10.13 EPIGASTRIC PAIN: ICD-10-CM

## 2023-10-04 RX ORDER — SUCRALFATE 1 G/1
TABLET ORAL
Qty: 120 TABLET | Refills: 0 | Status: SHIPPED | OUTPATIENT
Start: 2023-10-04

## 2023-10-09 RX ORDER — PANTOPRAZOLE SODIUM 40 MG/1
TABLET, DELAYED RELEASE ORAL
Qty: 30 TABLET | Refills: 0 | Status: SHIPPED | OUTPATIENT
Start: 2023-10-09

## 2023-10-16 DIAGNOSIS — R93.3 ABNORMAL CT SCAN, STOMACH: ICD-10-CM

## 2023-10-16 DIAGNOSIS — K92.1 COMPLAINT OF MELENA: ICD-10-CM

## 2023-10-16 DIAGNOSIS — R10.13 EPIGASTRIC PAIN: ICD-10-CM

## 2023-10-16 RX ORDER — PROMETHAZINE HYDROCHLORIDE 25 MG/1
TABLET ORAL
Qty: 45 TABLET | Refills: 0 | Status: SHIPPED | OUTPATIENT
Start: 2023-10-16

## 2023-11-22 RX ORDER — PANTOPRAZOLE SODIUM 40 MG/1
TABLET, DELAYED RELEASE ORAL
Qty: 90 TABLET | Refills: 3 | Status: SHIPPED | OUTPATIENT
Start: 2023-11-22

## 2023-12-01 ENCOUNTER — TELEPHONE (OUTPATIENT)
Dept: GASTROENTEROLOGY | Facility: CLINIC | Age: 53
End: 2023-12-01
Payer: MEDICARE

## 2023-12-08 ENCOUNTER — PREP FOR SURGERY (OUTPATIENT)
Dept: SURGERY | Facility: SURGERY CENTER | Age: 53
End: 2023-12-08
Payer: MEDICARE

## 2023-12-08 DIAGNOSIS — K22.70 BARRETT'S ESOPHAGUS WITHOUT DYSPLASIA: Primary | ICD-10-CM

## 2023-12-08 RX ORDER — SODIUM CHLORIDE 0.9 % (FLUSH) 0.9 %
3 SYRINGE (ML) INJECTION EVERY 12 HOURS SCHEDULED
OUTPATIENT
Start: 2023-12-08

## 2023-12-08 RX ORDER — SODIUM CHLORIDE, SODIUM LACTATE, POTASSIUM CHLORIDE, CALCIUM CHLORIDE 600; 310; 30; 20 MG/100ML; MG/100ML; MG/100ML; MG/100ML
30 INJECTION, SOLUTION INTRAVENOUS CONTINUOUS PRN
OUTPATIENT
Start: 2023-12-08

## 2023-12-08 RX ORDER — SODIUM CHLORIDE 0.9 % (FLUSH) 0.9 %
10 SYRINGE (ML) INJECTION AS NEEDED
OUTPATIENT
Start: 2023-12-08

## 2023-12-19 DIAGNOSIS — K92.1 COMPLAINT OF MELENA: ICD-10-CM

## 2023-12-19 DIAGNOSIS — R10.13 EPIGASTRIC PAIN: ICD-10-CM

## 2023-12-19 DIAGNOSIS — R93.3 ABNORMAL CT SCAN, STOMACH: ICD-10-CM

## 2023-12-19 RX ORDER — SUCRALFATE 1 G/1
1 TABLET ORAL 4 TIMES DAILY
Qty: 120 TABLET | Refills: 0 | Status: SHIPPED | OUTPATIENT
Start: 2023-12-19

## 2024-01-29 DIAGNOSIS — R10.13 EPIGASTRIC PAIN: ICD-10-CM

## 2024-01-29 DIAGNOSIS — K92.1 COMPLAINT OF MELENA: ICD-10-CM

## 2024-01-29 DIAGNOSIS — R93.3 ABNORMAL CT SCAN, STOMACH: ICD-10-CM

## 2024-01-30 RX ORDER — SUCRALFATE 1 G/1
1 TABLET ORAL 4 TIMES DAILY
Qty: 120 TABLET | Refills: 0 | Status: SHIPPED | OUTPATIENT
Start: 2024-01-30

## 2024-01-30 RX ORDER — SUCRALFATE 1 G/1
1 TABLET ORAL 4 TIMES DAILY
Qty: 120 TABLET | Refills: 0 | OUTPATIENT
Start: 2024-01-30

## 2024-02-07 ENCOUNTER — ANESTHESIA (OUTPATIENT)
Dept: SURGERY | Facility: SURGERY CENTER | Age: 54
End: 2024-02-07
Payer: MEDICARE

## 2024-02-07 ENCOUNTER — ANESTHESIA EVENT (OUTPATIENT)
Dept: SURGERY | Facility: SURGERY CENTER | Age: 54
End: 2024-02-07
Payer: MEDICARE

## 2024-02-07 ENCOUNTER — HOSPITAL ENCOUNTER (OUTPATIENT)
Facility: SURGERY CENTER | Age: 54
Setting detail: HOSPITAL OUTPATIENT SURGERY
Discharge: HOME | End: 2024-02-07
Attending: INTERNAL MEDICINE | Admitting: INTERNAL MEDICINE
Payer: MEDICARE

## 2024-02-07 VITALS
BODY MASS INDEX: 26.23 KG/M2 | WEIGHT: 157.4 LBS | HEIGHT: 65 IN | RESPIRATION RATE: 16 BRPM | OXYGEN SATURATION: 99 % | DIASTOLIC BLOOD PRESSURE: 69 MMHG | TEMPERATURE: 97 F | HEART RATE: 91 BPM | SYSTOLIC BLOOD PRESSURE: 102 MMHG

## 2024-02-07 DIAGNOSIS — K22.70 BARRETT'S ESOPHAGUS WITHOUT DYSPLASIA: ICD-10-CM

## 2024-02-07 PROCEDURE — 25010000002 PROPOFOL 10 MG/ML EMULSION: Performed by: ANESTHESIOLOGY

## 2024-02-07 PROCEDURE — 25810000003 LACTATED RINGERS PER 1000 ML: Performed by: INTERNAL MEDICINE

## 2024-02-07 PROCEDURE — 43239 EGD BIOPSY SINGLE/MULTIPLE: CPT | Performed by: INTERNAL MEDICINE

## 2024-02-07 PROCEDURE — 88305 TISSUE EXAM BY PATHOLOGIST: CPT | Performed by: INTERNAL MEDICINE

## 2024-02-07 PROCEDURE — 25010000002 LIDOCAINE 1 % SOLUTION: Performed by: ANESTHESIOLOGY

## 2024-02-07 PROCEDURE — 88342 IMHCHEM/IMCYTCHM 1ST ANTB: CPT | Performed by: INTERNAL MEDICINE

## 2024-02-07 RX ORDER — OMEPRAZOLE 40 MG/1
40 CAPSULE, DELAYED RELEASE ORAL DAILY
Qty: 30 CAPSULE | Refills: 5 | Status: SHIPPED | OUTPATIENT
Start: 2024-02-07

## 2024-02-07 RX ORDER — SODIUM CHLORIDE 0.9 % (FLUSH) 0.9 %
10 SYRINGE (ML) INJECTION AS NEEDED
Status: DISCONTINUED | OUTPATIENT
Start: 2024-02-07 | End: 2024-02-07 | Stop reason: HOSPADM

## 2024-02-07 RX ORDER — SODIUM CHLORIDE 0.9 % (FLUSH) 0.9 %
3 SYRINGE (ML) INJECTION EVERY 12 HOURS SCHEDULED
Status: DISCONTINUED | OUTPATIENT
Start: 2024-02-07 | End: 2024-02-07 | Stop reason: HOSPADM

## 2024-02-07 RX ORDER — PROPOFOL 10 MG/ML
VIAL (ML) INTRAVENOUS AS NEEDED
Status: DISCONTINUED | OUTPATIENT
Start: 2024-02-07 | End: 2024-02-07 | Stop reason: SURG

## 2024-02-07 RX ORDER — FENTANYL CITRATE 50 UG/ML
25 INJECTION, SOLUTION INTRAMUSCULAR; INTRAVENOUS
Status: DISCONTINUED | OUTPATIENT
Start: 2024-02-07 | End: 2024-02-07 | Stop reason: HOSPADM

## 2024-02-07 RX ORDER — LABETALOL HYDROCHLORIDE 5 MG/ML
5 INJECTION, SOLUTION INTRAVENOUS
Status: DISCONTINUED | OUTPATIENT
Start: 2024-02-07 | End: 2024-02-07 | Stop reason: HOSPADM

## 2024-02-07 RX ORDER — SODIUM CHLORIDE, SODIUM LACTATE, POTASSIUM CHLORIDE, CALCIUM CHLORIDE 600; 310; 30; 20 MG/100ML; MG/100ML; MG/100ML; MG/100ML
30 INJECTION, SOLUTION INTRAVENOUS CONTINUOUS PRN
Status: DISCONTINUED | OUTPATIENT
Start: 2024-02-07 | End: 2024-02-07 | Stop reason: HOSPADM

## 2024-02-07 RX ORDER — LIDOCAINE HYDROCHLORIDE 10 MG/ML
INJECTION, SOLUTION INFILTRATION; PERINEURAL AS NEEDED
Status: DISCONTINUED | OUTPATIENT
Start: 2024-02-07 | End: 2024-02-07 | Stop reason: SURG

## 2024-02-07 RX ORDER — ONDANSETRON 2 MG/ML
4 INJECTION INTRAMUSCULAR; INTRAVENOUS ONCE AS NEEDED
Status: DISCONTINUED | OUTPATIENT
Start: 2024-02-07 | End: 2024-02-07 | Stop reason: HOSPADM

## 2024-02-07 RX ORDER — HYDRALAZINE HYDROCHLORIDE 20 MG/ML
10 INJECTION INTRAMUSCULAR; INTRAVENOUS
Status: DISCONTINUED | OUTPATIENT
Start: 2024-02-07 | End: 2024-02-07 | Stop reason: HOSPADM

## 2024-02-07 RX ADMIN — LIDOCAINE HYDROCHLORIDE 30 MG: 10 INJECTION, SOLUTION INFILTRATION; PERINEURAL at 08:55

## 2024-02-07 RX ADMIN — PROPOFOL 200 MCG/KG/MIN: 10 INJECTION, EMULSION INTRAVENOUS at 08:55

## 2024-02-07 RX ADMIN — SODIUM CHLORIDE, POTASSIUM CHLORIDE, SODIUM LACTATE AND CALCIUM CHLORIDE 30 ML/HR: 600; 310; 30; 20 INJECTION, SOLUTION INTRAVENOUS at 08:38

## 2024-02-07 RX ADMIN — PROPOFOL 150 MG: 10 INJECTION, EMULSION INTRAVENOUS at 08:55

## 2024-02-07 NOTE — H&P
No chief complaint on file.      HPI  barretts         Problem List:    Patient Active Problem List   Diagnosis    Epigastric pain    Complaint of melena    NSAID long-term use    Rectal bleeding    Nausea    Abnormal CT scan, stomach    Noonan's esophagus without dysplasia    Irritable bowel syndrome with constipation    Abdominal bloating       Medical History:    Past Medical History:   Diagnosis Date    Noonan's esophagus without dysplasia 03/13/2023    Cancer 2014 & 2021    Breast cancer- DMX    Colon polyp 2019    Gastritis 2021    GERD (gastroesophageal reflux disease)     Hernia 2009    3 hernias    Irritable bowel syndrome 2009    Rectal bleeding 10/12/2022        Social History:    Social History     Socioeconomic History    Marital status:    Tobacco Use    Smoking status: Never    Smokeless tobacco: Never   Vaping Use    Vaping Use: Never used   Substance and Sexual Activity    Alcohol use: Yes     Comment: Maybe 3 glasses a month    Drug use: Never    Sexual activity: Yes     Partners: Male     Birth control/protection: Post-menopausal       Family History:   Family History   Problem Relation Age of Onset    Colon polyps Father     Colon cancer Neg Hx     Crohn's disease Neg Hx     Irritable bowel syndrome Neg Hx     Ulcerative colitis Neg Hx        Surgical History:   Past Surgical History:   Procedure Laterality Date    ABDOMINAL SURGERY  2009    Laparoscopic (x2)    APPENDECTOMY  2009    BREAST RECONSTRUCTION Bilateral     2022    COLONOSCOPY  2019    COLONOSCOPY W/ POLYPECTOMY N/A 1/26/2023    Procedure: COLONOSCOPY WITH POLYPECTOMY;  Surgeon: En Ruiz MD;  [place] [facility] [place];  Service: Gastroenterology;  Laterality: N/A;  FAIR PREP, POLYPS X2,. HEMORRHOIDS    ENDOSCOPY N/A 1/26/2023    Procedure: ESOPHAGOGASTRODUODENOSCOPY WITH BIOPSY;  Surgeon: En Ruiz MD;  [place] [facility] [place];  Service: Gastroenterology;  Laterality: N/A;  IRREGULAR Z LINE, GASTRITIS WITH  EROSION    HERNIA REPAIR  2009    3 hernias treated    MASTECTOMY Bilateral 2021    UPPER GASTROINTESTINAL ENDOSCOPY  2007       No current facility-administered medications for this encounter.    Current Outpatient Medications:     sucralfate (CARAFATE) 1 g tablet, Take 1 tablet by mouth 4 times daily, Disp: 120 tablet, Rfl: 0    cholecalciferol (VITAMIN D3) 25 MCG (1000 UT) tablet, Take 2 tablets by mouth Daily., Disp: , Rfl:     diazePAM (VALIUM) 5 MG tablet, Take 1 tablet by mouth 2 (Two) Times a Day As Needed for Anxiety. Pt takes 1/2 a tablet as needed, Disp: , Rfl:     loratadine (CLARITIN) 10 MG tablet, Take  by mouth., Disp: , Rfl:     pantoprazole (PROTONIX) 40 MG EC tablet, Take 1 tablet by mouth once daily, Disp: 90 tablet, Rfl: 3    promethazine (PHENERGAN) 25 MG tablet, TAKE 1 TABLET BY MOUTH EVERY 6 HOURS AS NEEDED FOR NAUSEA FOR VOMITING, Disp: 45 tablet, Rfl: 0    Tenapanor HCl 50 MG tablet, Take 1 tablet by mouth 2 (Two) Times a Day With Meals., Disp: 60 tablet, Rfl: 3    traMADol (ULTRAM) 50 MG tablet, Take 1 tablet by mouth Every 6 (Six) Hours As Needed for Moderate Pain., Disp: , Rfl:     venlafaxine (EFFEXOR) 37.5 MG tablet, Take 1 tablet by mouth Daily., Disp: , Rfl:     Allergies:   Allergies   Allergen Reactions    Celecoxib Other (See Comments)    Lyrica [Pregabalin] Other (See Comments)     nightmares    Bactrim [Sulfamethoxazole-Trimethoprim] Rash        The following portions of the patient's history were reviewed by me and updated as appropriate: review of systems, allergies, current medications, past family history, past medical history, past social history, past surgical history and problem list.    There were no vitals filed for this visit.    PHYSICAL EXAM:    CONSTITUTIONAL:  today's vital signs reviewed by me  GASTROINTESTINAL: abdomen is soft nontender nondistended with normal active bowel sounds, no masses are appreciated    Assessment/ Plan  Barretts    egd    Risks and  benefits as well as alternatives to endoscopic evaluation were explained to the patient and they voiced understanding and wish to proceed.  These risks include but are not limited to the risk of bleeding, perforation, adverse reaction to sedation, and missed lesions.  The patient was given the opportunity to ask questions prior to the endoscopic procedure.

## 2024-02-07 NOTE — ANESTHESIA POSTPROCEDURE EVALUATION
"Patient: Jennie Pederson    Procedure Summary       Date: 02/07/24 Room / Location[facility]    Anesthesia Start: 0850 Anesthesia Stop: 0908    Procedure: ESOPHAGOGASTRODUODENOSCOPY Diagnosis:       Noonan's esophagus without dysplasia      (Noonan's esophagus without dysplasia [K22.70])    Surgeons: En Ruiz MD Provider: Gabe Puckett MD    Anesthesia Type: MAC ASA Status: 2            Anesthesia Type: MAC    Vitals  Vitals Value Taken Time   /76 02/07/24 0911   Temp 36.1 °C (97 °F) 02/07/24 0908   Pulse 84 02/07/24 0911   Resp 16 02/07/24 0908   SpO2 100 % 02/07/24 0911   Vitals shown include unfiled device data.        Post Anesthesia Care and Evaluation    Patient location during evaluation: bedside  Pain management: adequate    Airway patency: patent  Anesthetic complications: No anesthetic complications    Cardiovascular status: acceptable  Respiratory status: acceptable  Hydration status: acceptable    Comments: */76   Pulse 84   Temp 36.1 °C (97 °F) (Temporal)   Resp 16   Ht 165.1 cm (65\")   Wt 71.4 kg (157 lb 6.4 oz)   SpO2 100%   BMI 26.19 kg/m²       "

## 2024-02-07 NOTE — ANESTHESIA PREPROCEDURE EVALUATION
Anesthesia Evaluation     no history of anesthetic complications:   NPO Solid Status: > 8 hours  NPO Liquid Status: > 2 hours           Airway   Mallampati: I  Neck ROM: full  no difficulty expected  Dental - normal exam     Pulmonary - negative pulmonary ROS and normal exam   (-) COPD, asthma, sleep apnea, not a smoker    PE comment: nonlabored  Cardiovascular - negative cardio ROS and normal exam  Exercise tolerance: good (4-7 METS)    Rhythm: regular  Rate: normal    (-) hypertension, valvular problems/murmurs, past MI, CAD, dysrhythmias, angina      Neuro/Psych- negative ROS  (-) seizures, TIA, CVA  GI/Hepatic/Renal/Endo    (+) GERD (w/ Noonan's esophagus), GI bleeding (rectal bleeding)   (-) liver disease, no renal disease, diabetes, no thyroid disorder    ROS Comment: IBS w/ constipation;  Gastritis;  Nausea    Musculoskeletal (-) negative ROS    Abdominal    Substance History      OB/GYN          Other      history of cancer (Breast cancer) remission                  Anesthesia Plan    ASA 2     MAC       Anesthetic plan, risks, benefits, and alternatives have been provided, discussed and informed consent has been obtained with: patient.    CODE STATUS:

## 2024-02-09 LAB
LAB AP CASE REPORT: NORMAL
LAB AP DIAGNOSIS COMMENT: NORMAL
PATH REPORT.FINAL DX SPEC: NORMAL
PATH REPORT.GROSS SPEC: NORMAL

## 2024-03-02 DIAGNOSIS — R93.3 ABNORMAL CT SCAN, STOMACH: ICD-10-CM

## 2024-03-02 DIAGNOSIS — K92.1 COMPLAINT OF MELENA: ICD-10-CM

## 2024-03-02 DIAGNOSIS — R10.13 EPIGASTRIC PAIN: ICD-10-CM

## 2024-03-04 RX ORDER — SUCRALFATE 1 G/1
1 TABLET ORAL 4 TIMES DAILY
Qty: 120 TABLET | Refills: 0 | Status: SHIPPED | OUTPATIENT
Start: 2024-03-04

## 2024-03-04 NOTE — TELEPHONE ENCOUNTER
I have sent in 1 refill for 120 tablets.  The patient will need an office follow-up for any additional refills.  Please contact the patient to schedule follow-up with NATALY Granger.  Thank you.  Louisa INGRAM

## 2024-04-08 DIAGNOSIS — R10.13 EPIGASTRIC PAIN: ICD-10-CM

## 2024-04-08 DIAGNOSIS — R93.3 ABNORMAL CT SCAN, STOMACH: ICD-10-CM

## 2024-04-08 DIAGNOSIS — K92.1 COMPLAINT OF MELENA: ICD-10-CM

## 2024-04-08 RX ORDER — SUCRALFATE 1 G/1
1 TABLET ORAL 4 TIMES DAILY
Qty: 120 TABLET | Refills: 0 | Status: SHIPPED | OUTPATIENT
Start: 2024-04-08

## 2024-04-16 DIAGNOSIS — R93.3 ABNORMAL CT SCAN, STOMACH: ICD-10-CM

## 2024-04-16 DIAGNOSIS — R10.13 EPIGASTRIC PAIN: ICD-10-CM

## 2024-04-16 DIAGNOSIS — K92.1 COMPLAINT OF MELENA: ICD-10-CM

## 2024-04-16 RX ORDER — PROMETHAZINE HYDROCHLORIDE 25 MG/1
12.5 TABLET ORAL EVERY 6 HOURS PRN
Qty: 45 TABLET | Refills: 0 | Status: SHIPPED | OUTPATIENT
Start: 2024-04-16

## 2024-05-14 DIAGNOSIS — K92.1 COMPLAINT OF MELENA: ICD-10-CM

## 2024-05-14 DIAGNOSIS — R10.13 EPIGASTRIC PAIN: ICD-10-CM

## 2024-05-14 DIAGNOSIS — R93.3 ABNORMAL CT SCAN, STOMACH: ICD-10-CM

## 2024-05-14 RX ORDER — SUCRALFATE 1 G/1
1 TABLET ORAL 4 TIMES DAILY
Qty: 120 TABLET | Refills: 0 | Status: SHIPPED | OUTPATIENT
Start: 2024-05-14

## 2024-05-16 DIAGNOSIS — K92.1 COMPLAINT OF MELENA: ICD-10-CM

## 2024-05-16 DIAGNOSIS — R93.3 ABNORMAL CT SCAN, STOMACH: ICD-10-CM

## 2024-05-16 DIAGNOSIS — R10.13 EPIGASTRIC PAIN: ICD-10-CM

## 2024-05-16 RX ORDER — PROMETHAZINE HYDROCHLORIDE 25 MG/1
12.5 TABLET ORAL EVERY 6 HOURS PRN
Qty: 45 TABLET | Refills: 0 | Status: SHIPPED | OUTPATIENT
Start: 2024-05-16

## 2024-06-11 DIAGNOSIS — R93.3 ABNORMAL CT SCAN, STOMACH: ICD-10-CM

## 2024-06-11 DIAGNOSIS — R10.13 EPIGASTRIC PAIN: ICD-10-CM

## 2024-06-11 DIAGNOSIS — K92.1 COMPLAINT OF MELENA: ICD-10-CM

## 2024-06-11 RX ORDER — SUCRALFATE 1 G/1
1 TABLET ORAL 4 TIMES DAILY
Qty: 120 TABLET | Refills: 0 | Status: SHIPPED | OUTPATIENT
Start: 2024-06-11

## 2024-07-03 DIAGNOSIS — R93.3 ABNORMAL CT SCAN, STOMACH: ICD-10-CM

## 2024-07-03 DIAGNOSIS — R10.13 EPIGASTRIC PAIN: ICD-10-CM

## 2024-07-03 DIAGNOSIS — K92.1 COMPLAINT OF MELENA: ICD-10-CM

## 2024-07-03 RX ORDER — PROMETHAZINE HYDROCHLORIDE 25 MG/1
TABLET ORAL
Qty: 45 TABLET | Refills: 0 | Status: SHIPPED | OUTPATIENT
Start: 2024-07-03

## 2024-07-08 RX ORDER — OMEPRAZOLE 40 MG/1
40 CAPSULE, DELAYED RELEASE ORAL DAILY
Qty: 90 CAPSULE | Refills: 0 | Status: SHIPPED | OUTPATIENT
Start: 2024-07-08

## 2024-07-13 DIAGNOSIS — R10.13 EPIGASTRIC PAIN: ICD-10-CM

## 2024-07-13 DIAGNOSIS — R93.3 ABNORMAL CT SCAN, STOMACH: ICD-10-CM

## 2024-07-13 DIAGNOSIS — K92.1 COMPLAINT OF MELENA: ICD-10-CM

## 2024-07-15 RX ORDER — SUCRALFATE 1 G/1
1 TABLET ORAL 4 TIMES DAILY
Qty: 120 TABLET | Refills: 0 | Status: SHIPPED | OUTPATIENT
Start: 2024-07-15

## 2024-08-17 DIAGNOSIS — R10.13 EPIGASTRIC PAIN: ICD-10-CM

## 2024-08-17 DIAGNOSIS — K92.1 COMPLAINT OF MELENA: ICD-10-CM

## 2024-08-17 DIAGNOSIS — R93.3 ABNORMAL CT SCAN, STOMACH: ICD-10-CM

## 2024-08-19 RX ORDER — SUCRALFATE 1 G/1
1 TABLET ORAL 4 TIMES DAILY
Qty: 120 TABLET | Refills: 0 | Status: SHIPPED | OUTPATIENT
Start: 2024-08-19

## 2024-08-19 RX ORDER — PROMETHAZINE HYDROCHLORIDE 25 MG/1
TABLET ORAL
Qty: 45 TABLET | Refills: 0 | Status: SHIPPED | OUTPATIENT
Start: 2024-08-19

## 2024-09-13 RX ORDER — OMEPRAZOLE 40 MG/1
40 CAPSULE, DELAYED RELEASE ORAL DAILY
Qty: 90 CAPSULE | Refills: 0 | Status: SHIPPED | OUTPATIENT
Start: 2024-09-13

## 2024-09-15 DIAGNOSIS — R10.13 EPIGASTRIC PAIN: ICD-10-CM

## 2024-09-15 DIAGNOSIS — R93.3 ABNORMAL CT SCAN, STOMACH: ICD-10-CM

## 2024-09-15 DIAGNOSIS — K92.1 COMPLAINT OF MELENA: ICD-10-CM

## 2024-09-16 RX ORDER — SUCRALFATE 1 G/1
1 TABLET ORAL 4 TIMES DAILY
Qty: 120 TABLET | Refills: 0 | Status: SHIPPED | OUTPATIENT
Start: 2024-09-16

## 2024-10-01 DIAGNOSIS — R93.3 ABNORMAL CT SCAN, STOMACH: ICD-10-CM

## 2024-10-01 DIAGNOSIS — R10.13 EPIGASTRIC PAIN: ICD-10-CM

## 2024-10-01 DIAGNOSIS — K92.1 COMPLAINT OF MELENA: ICD-10-CM

## 2024-10-02 RX ORDER — PROMETHAZINE HYDROCHLORIDE 25 MG/1
TABLET ORAL
Qty: 45 TABLET | Refills: 0 | Status: SHIPPED | OUTPATIENT
Start: 2024-10-02

## 2024-10-15 DIAGNOSIS — K92.1 COMPLAINT OF MELENA: ICD-10-CM

## 2024-10-15 DIAGNOSIS — R10.13 EPIGASTRIC PAIN: ICD-10-CM

## 2024-10-15 DIAGNOSIS — R93.3 ABNORMAL CT SCAN, STOMACH: ICD-10-CM

## 2024-10-15 RX ORDER — SUCRALFATE 1 G/1
1 TABLET ORAL 4 TIMES DAILY
Qty: 120 TABLET | Refills: 0 | Status: SHIPPED | OUTPATIENT
Start: 2024-10-15

## 2024-11-01 DIAGNOSIS — R93.3 ABNORMAL CT SCAN, STOMACH: ICD-10-CM

## 2024-11-01 DIAGNOSIS — R10.13 EPIGASTRIC PAIN: ICD-10-CM

## 2024-11-01 DIAGNOSIS — K92.1 COMPLAINT OF MELENA: ICD-10-CM

## 2024-11-01 RX ORDER — PROMETHAZINE HYDROCHLORIDE 25 MG/1
TABLET ORAL
Qty: 45 TABLET | Refills: 0 | Status: SHIPPED | OUTPATIENT
Start: 2024-11-01

## 2024-11-17 DIAGNOSIS — K92.1 COMPLAINT OF MELENA: ICD-10-CM

## 2024-11-17 DIAGNOSIS — R93.3 ABNORMAL CT SCAN, STOMACH: ICD-10-CM

## 2024-11-17 DIAGNOSIS — R10.13 EPIGASTRIC PAIN: ICD-10-CM

## 2024-11-18 DIAGNOSIS — R93.3 ABNORMAL CT SCAN, STOMACH: ICD-10-CM

## 2024-11-18 DIAGNOSIS — K92.1 COMPLAINT OF MELENA: ICD-10-CM

## 2024-11-18 DIAGNOSIS — R10.13 EPIGASTRIC PAIN: ICD-10-CM

## 2024-11-18 RX ORDER — SUCRALFATE 1 G/1
1 TABLET ORAL 4 TIMES DAILY
Qty: 120 TABLET | Refills: 0 | OUTPATIENT
Start: 2024-11-18

## 2024-11-18 RX ORDER — SUCRALFATE 1 G/1
1 TABLET ORAL 4 TIMES DAILY
Qty: 120 TABLET | Refills: 0 | Status: SHIPPED | OUTPATIENT
Start: 2024-11-18

## 2024-11-18 NOTE — TELEPHONE ENCOUNTER
Typo error, she was seen in 2024 by Dr Ruiz, 9 months ago.     Surgery with En Ruiz MD (02/07/2024)     Should I send this refill to another APRN or call pt to schedule with you?

## 2024-11-19 ENCOUNTER — OFFICE VISIT (OUTPATIENT)
Dept: FAMILY MEDICINE CLINIC | Facility: CLINIC | Age: 54
End: 2024-11-19
Payer: MEDICARE

## 2024-11-19 VITALS
HEART RATE: 89 BPM | RESPIRATION RATE: 16 BRPM | SYSTOLIC BLOOD PRESSURE: 110 MMHG | HEIGHT: 65 IN | WEIGHT: 143.2 LBS | OXYGEN SATURATION: 97 % | TEMPERATURE: 96.9 F | BODY MASS INDEX: 23.86 KG/M2 | DIASTOLIC BLOOD PRESSURE: 70 MMHG

## 2024-11-19 DIAGNOSIS — Z00.00 HEALTHCARE MAINTENANCE: ICD-10-CM

## 2024-11-19 DIAGNOSIS — R05.1 ACUTE COUGH: Primary | ICD-10-CM

## 2024-11-19 DIAGNOSIS — G43.709 CHRONIC MIGRAINE WITHOUT AURA WITHOUT STATUS MIGRAINOSUS, NOT INTRACTABLE: ICD-10-CM

## 2024-11-19 PROBLEM — Z85.3 HISTORY OF BREAST CANCER: Status: ACTIVE | Noted: 2024-11-19

## 2024-11-19 PROCEDURE — 99204 OFFICE O/P NEW MOD 45 MIN: CPT | Performed by: STUDENT IN AN ORGANIZED HEALTH CARE EDUCATION/TRAINING PROGRAM

## 2024-11-19 PROCEDURE — 1160F RVW MEDS BY RX/DR IN RCRD: CPT | Performed by: STUDENT IN AN ORGANIZED HEALTH CARE EDUCATION/TRAINING PROGRAM

## 2024-11-19 PROCEDURE — 1126F AMNT PAIN NOTED NONE PRSNT: CPT | Performed by: STUDENT IN AN ORGANIZED HEALTH CARE EDUCATION/TRAINING PROGRAM

## 2024-11-19 PROCEDURE — 1159F MED LIST DOCD IN RCRD: CPT | Performed by: STUDENT IN AN ORGANIZED HEALTH CARE EDUCATION/TRAINING PROGRAM

## 2024-11-19 PROCEDURE — G2211 COMPLEX E/M VISIT ADD ON: HCPCS | Performed by: STUDENT IN AN ORGANIZED HEALTH CARE EDUCATION/TRAINING PROGRAM

## 2024-11-19 RX ORDER — SUMATRIPTAN 100 MG/1
TABLET, FILM COATED ORAL
Qty: 10 TABLET | Refills: 2 | Status: SHIPPED | OUTPATIENT
Start: 2024-11-19

## 2024-11-19 RX ORDER — SPIRONOLACTONE 25 MG/1
25 TABLET ORAL DAILY
COMMUNITY

## 2024-11-19 NOTE — PROGRESS NOTES
New Patient Office Visit      Patient Name: Jennie Pederson  : 1970   MRN: [id number]   Care Team: Patient Care Team:  Jevon Traylor MD as PCP - General (Internal Medicine)  Yumiko Lares APRN as Nurse Practitioner (Gastroenterology)  Irene Barajas APRN as Nurse Practitioner (Gastroenterology)    Chief Complaint:    Chief Complaint   Patient presents with    Establish Care    Cough       History of Present Illness: Jennie Pederson is a 53 y.o. female     History of Present Illness  The patient is a 53-year-old female with a history of IBS-C and Noonan's esophagus who presents today to establish care and to follow up on chronic health conditions as well as a new complaint of cough. She is accompanied by an adult male.    She developed a cough around 2024, which she initially thought was allergy-related. The cough has not subsided and is sometimes wet, but mostly feels like peppers in her throat. She does not produce any sputum and reports no fevers. She felt slightly unwell yesterday and took Zicam, which helped. She also takes a powder drink called Emergency. She has congestion and uses Nasacort in the morning and at bedtime. She has tried Sudafed, but it is not always available. She sometimes feels liquid in her ears that does not drain. She has been taking a honey-based syrup daily and reports no sore throat.    She has a history of breast cancer, which was treated with radiation initially and later with a double mastectomy in .    She also has a history of IBS-C and Noonan's esophagus, for which she is under the care of a gastroenterologist. Her last endoscopy was in 2024, and she is scheduled for another in . She believes her Noonan's esophagus is under control and is currently taking Prilosec 40 mg daily and sucralfate. She also has gastroparesis, which affects her digestion. She is on venlafaxine, which was prescribed during her menopause,  but she is trying to wean off it.    She has not had a tetanus shot in the last 10 years and has never had a Medicare wellness visit. She has interstitial cystitis and has never had an abnormal Pap smear. She was tested for STDs when she first fell ill.    She suffers from frequent migraines and takes Excedrin Migraine daily, sometimes needing a second dose in the afternoon. She has noticed a tender spot at the back of her head, which feels like a bruise. The location of her headaches varies, and she sometimes experiences sensitivity to light and sound. She does not have any aura before the onset of a headache. She used to take Goody powders, but stopped when she realized they were contributing to her Noonan's esophagus. She has not tried triptans.       Subjective      Past Medical History:   Past Medical History:   Diagnosis Date    Noonan's esophagus without dysplasia 03/13/2023    Cancer 2014 & 2021    Breast cancer- DMX    Colon polyp 2019    Gastritis 2021    GERD (gastroesophageal reflux disease)     Hernia 2009    3 hernias    Irritable bowel syndrome 2009    Rectal bleeding 10/12/2022       Past Surgical History:   Past Surgical History:   Procedure Laterality Date    ABDOMINAL SURGERY  2009    Laparoscopic (x2)    APPENDECTOMY  2009    BREAST RECONSTRUCTION Bilateral     2022    COLONOSCOPY  2019    COLONOSCOPY W/ POLYPECTOMY N/A 1/26/2023    Procedure: COLONOSCOPY WITH POLYPECTOMY;  Surgeon: En Ruiz MD;  Location: [facility] [place] [facility] [place];  Service: Gastroenterology;  Laterality: N/A;  FAIR PREP, POLYPS X2,. HEMORRHOIDS    ENDOSCOPY N/A 1/26/2023    Procedure: ESOPHAGOGASTRODUODENOSCOPY WITH BIOPSY;  Surgeon: En Ruiz MD;  Location: [facility] [place] [facility] OR;  Service: Gastroenterology;  Laterality: N/A;  IRREGULAR Z LINE, GASTRITIS WITH EROSION    ENDOSCOPY N/A 2/7/2024    Procedure: ESOPHAGOGASTRODUODENOSCOPY;  Surgeon: En Ruiz MD;  [facility];  Service: Gastroenterology;   Laterality: N/A;  Barretts Esophagus, Gastritis    HERNIA REPAIR  2009    3 hernias treated    MASTECTOMY Bilateral 2021    UPPER GASTROINTESTINAL ENDOSCOPY  2007       Family History:   Family History   Problem Relation Age of Onset    Colon polyps Father     Colon cancer Neg Hx     Crohn's disease Neg Hx     Irritable bowel syndrome Neg Hx     Ulcerative colitis Neg Hx        Social History:   Social History     Socioeconomic History    Marital status:    Tobacco Use    Smoking status: Never    Smokeless tobacco: Never   Vaping Use    Vaping status: Never Used   Substance and Sexual Activity    Alcohol use: Yes     Comment: Maybe 3 glasses a month    Drug use: Never    Sexual activity: Yes     Partners: Male     Birth control/protection: Post-menopausal       Tobacco History:   Social History     Tobacco Use   Smoking Status Never   Smokeless Tobacco Never       Medications:     Current Outpatient Medications:     diazePAM (VALIUM) 5 MG tablet, Take 1 tablet by mouth 2 (Two) Times a Day As Needed for Anxiety. Pt takes 1/2 a tablet as needed, Disp: , Rfl:     omeprazole (priLOSEC) 40 MG capsule, Take 1 capsule by mouth once daily, Disp: 90 capsule, Rfl: 0    promethazine (PHENERGAN) 25 MG tablet, TAKE 1/2 (ONE-HALF) TABLET BY MOUTH EVERY 6 HOURS AS NEEDED FOR NAUSEA AND FOR VOMITING, Disp: 45 tablet, Rfl: 0    spironolactone (ALDACTONE) 25 MG tablet, Take 1 tablet by mouth Daily., Disp: , Rfl:     sucralfate (CARAFATE) 1 g tablet, Take 1 tablet by mouth 4 (Four) Times a Day., Disp: 120 tablet, Rfl: 0    traMADol (ULTRAM) 50 MG tablet, Take 1 tablet by mouth Every 6 (Six) Hours As Needed for Moderate Pain., Disp: , Rfl:     venlafaxine (EFFEXOR) 37.5 MG tablet, Take 1 tablet by mouth Daily., Disp: , Rfl:     Allergies:   Allergies   Allergen Reactions    Celecoxib Other (See Comments)    Lyrica [Pregabalin] Other (See Comments)     nightmares    Bactrim [Sulfamethoxazole-Trimethoprim] Rash       Objective  "    Physical Exam:  Vital Signs:   Vitals:    11/19/24 1053   BP: 110/70   BP Location: Left arm   Patient Position: Sitting   Cuff Size: Adult   Pulse: 89   Resp: 16   Temp: 96.9 °F (36.1 °C)   TempSrc: Temporal   SpO2: 97%   Weight: 65 kg (143 lb 3.2 oz)   Height: 165.1 cm (65\")   PainSc: 0-No pain     Body mass index is 23.83 kg/m².     Physical Exam  Constitutional:       General: She is not in acute distress.     Appearance: Normal appearance. She is normal weight.   HENT:      Head: Normocephalic and atraumatic.   Cardiovascular:      Rate and Rhythm: Normal rate and regular rhythm.   Pulmonary:      Effort: Pulmonary effort is normal. No respiratory distress.   Musculoskeletal:      Right lower leg: No edema.      Left lower leg: No edema.   Skin:     Findings: No rash.   Neurological:      General: No focal deficit present.      Mental Status: She is alert and oriented to person, place, and time.   Psychiatric:         Mood and Affect: Mood normal.         Behavior: Behavior normal.         Thought Content: Thought content normal.         Judgment: Judgment normal.         Assessment / Plan      Assessment/Plan:   Problems Addressed This Visit    Assessment & Plan  1. Establishment of care.  A tetanus vaccine will be administered today if available. Influenza and shingles vaccines are recommended, with the shingles vaccine to be planned around significant events due to potential side effects. An updated COVID-19 vaccine is also suggested, which can be obtained at a local pharmacy. Basic annual labs will be ordered to be completed prior to the next visit.    2. Cough.  The cough, which started around October 25, could be due to a viral illness or viral rhinosinusitis. Eustachian tube dysfunction is also a possibility. Long-term use of aspirin, such as in Excedrin, can induce a cough. Continuation of Nasacort is advised, along with nasal irrigation with saline prior to its use. Antitussive medications can be " used to suppress the cough. Honey is also recommended for its soothing effects. If the cough persists after 5 weeks, pulmonary function testing will be considered to rule out undiagnosed asthma.    3. Migraines.  A prescription for sumatriptan 100 mg will be provided. It can be taken as a single dose, and if symptoms persist or recur, a second dose can be taken 2 hours after the first. No more than 2 doses should be taken in a week. If sumatriptan is not effective, CGRP inhibitors like Ubrelvy may be considered.    4. Noonan's Esophagus.  Continue taking Prilosec 40 mg daily and sucralfate as prescribed. Follow up with gastroenterology as needed.    5. Gastroparesis.  Continue management as directed by gastroenterology.    6. Health Maintenance.  A Pap smear was completed on June 27, 2024, with negative results.     Follow-up  Patient will return in 3 months for a Medicare wellness visit.         Plan of care reviewed with patient at the conclusion of today's visit. Education was provided regarding diagnosis and management.  Patient verbalizes understanding of and agreement with management plan.      Follow Up:   No follow-ups on file.          Jevon Traylor MD  [facility]  [address] [address] [address]  Fax [phone]  Phone [phone]    Patient or patient representative verbalized consent for the use of Ambient Listening during the visit with  Jevon Traylor MD for chart documentation. 11/19/2024  11:33 EST

## 2024-11-25 ENCOUNTER — PATIENT MESSAGE (OUTPATIENT)
Dept: FAMILY MEDICINE CLINIC | Facility: CLINIC | Age: 54
End: 2024-11-25
Payer: MEDICARE

## 2024-12-05 ENCOUNTER — TELEPHONE (OUTPATIENT)
Dept: GASTROENTEROLOGY | Facility: CLINIC | Age: 54
End: 2024-12-05
Payer: MEDICARE

## 2024-12-05 NOTE — TELEPHONE ENCOUNTER
Hub staff attempted to follow warm transfer process and was unsuccessful     Caller: Jennie Pederson    Relationship to patient: Self    Best call back number: [phone]    Patient is needing: PT IS CALLING TO SCHEDULE HER YEARLY EGD. PLEASE CONTACT PT TO ASSIST.

## 2024-12-10 DIAGNOSIS — R93.3 ABNORMAL CT SCAN, STOMACH: ICD-10-CM

## 2024-12-10 DIAGNOSIS — R10.13 EPIGASTRIC PAIN: ICD-10-CM

## 2024-12-10 DIAGNOSIS — K92.1 COMPLAINT OF MELENA: ICD-10-CM

## 2024-12-11 RX ORDER — OMEPRAZOLE 40 MG/1
40 CAPSULE, DELAYED RELEASE ORAL DAILY
Qty: 90 CAPSULE | Refills: 0 | Status: SHIPPED | OUTPATIENT
Start: 2024-12-11

## 2024-12-11 RX ORDER — PROMETHAZINE HYDROCHLORIDE 25 MG/1
TABLET ORAL
Qty: 45 TABLET | Refills: 0 | Status: SHIPPED | OUTPATIENT
Start: 2024-12-11

## 2024-12-11 RX ORDER — PROMETHAZINE HYDROCHLORIDE 25 MG/1
12.5 TABLET ORAL EVERY 6 HOURS PRN
Qty: 45 TABLET | Refills: 0 | Status: SHIPPED | OUTPATIENT
Start: 2024-12-11

## 2024-12-12 ENCOUNTER — OFFICE VISIT (OUTPATIENT)
Dept: FAMILY MEDICINE CLINIC | Facility: CLINIC | Age: 54
End: 2024-12-12
Payer: MEDICARE

## 2024-12-12 VITALS
SYSTOLIC BLOOD PRESSURE: 100 MMHG | DIASTOLIC BLOOD PRESSURE: 80 MMHG | WEIGHT: 145.2 LBS | BODY MASS INDEX: 24.19 KG/M2 | RESPIRATION RATE: 16 BRPM | HEIGHT: 65 IN | HEART RATE: 98 BPM | TEMPERATURE: 98.2 F | OXYGEN SATURATION: 98 %

## 2024-12-12 DIAGNOSIS — B96.89 ACUTE BACTERIAL RHINOSINUSITIS: Primary | ICD-10-CM

## 2024-12-12 DIAGNOSIS — J01.90 ACUTE BACTERIAL RHINOSINUSITIS: Primary | ICD-10-CM

## 2024-12-12 PROCEDURE — G2211 COMPLEX E/M VISIT ADD ON: HCPCS | Performed by: STUDENT IN AN ORGANIZED HEALTH CARE EDUCATION/TRAINING PROGRAM

## 2024-12-12 PROCEDURE — 1125F AMNT PAIN NOTED PAIN PRSNT: CPT | Performed by: STUDENT IN AN ORGANIZED HEALTH CARE EDUCATION/TRAINING PROGRAM

## 2024-12-12 PROCEDURE — 99213 OFFICE O/P EST LOW 20 MIN: CPT | Performed by: STUDENT IN AN ORGANIZED HEALTH CARE EDUCATION/TRAINING PROGRAM

## 2024-12-12 NOTE — PROGRESS NOTES
Established Office Visit      Patient Name: Jennie Pederson  : 1970   MRN: [id number]   Care Team: Patient Care Team:  Jevon Traylor MD as PCP - General (Internal Medicine)  Yumiko Lares APRN as Nurse Practitioner (Gastroenterology)  Irene Barajas APRN as Nurse Practitioner (Gastroenterology)    Chief Complaint:    Chief Complaint   Patient presents with    Sinusitis       History of Present Illness: Jennie Pederson is a 54 y.o. female     History of Present Illness  The patient is a 54-year-old female who presents today with the chief complaint of concern for a possible sinus infection.    She reports persistent congestion and a cough, which she has managed to alleviate. However, she is uncertain if the postnasal drainage has led to a bacterial infection. She has a history of chronic sinusitis and underwent sinus surgery a few years ago. Her current symptoms are reminiscent of her previous experiences. The onset of her symptoms was in 2024, initially presenting as intermittent allergies. In 10/2024, her condition deteriorated, but she continued to experience periods of relief. Her focus shifted to the cough when it developed. During her last visit in 2024, she reported difficulty breathing, necessitating the cancellation of her dental appointment. Despite these challenges, she managed to attend her dental appointment on 2024. However, on 2024, she was bedridden due to severe pain behind her eyes, runny nose, and cheek pain. These symptoms have persisted for over a month. She describes a sensation of ear fullness, similar to the feeling of needing to pop her ears during a flight. She also reports nasal drainage, which was initially clear but has since turned yellowish. Her dentist observed throat redness but did not express any concern. She reports no fevers. She has been self-medicating with Zicam nasal swab, Emergen-C, fruit drops, Benadryl,  anti-inflammatories, and Nasacort, which have provided some relief for her cough. She has also been performing nasal rinses, which have been beneficial.    MEDICATIONS  Current: Zicam nasal swab, Benadryl, Nasacort       Subjective      I have reviewed and the following portions of the patient's history were updated as appropriate: past family history, past medical history, past social history, past surgical history and problem list.    Medications:     Current Outpatient Medications:     diazePAM (VALIUM) 5 MG tablet, Take 1 tablet by mouth 2 (Two) Times a Day As Needed for Anxiety. Pt takes 1/2 a tablet as needed, Disp: , Rfl:     omeprazole (priLOSEC) 40 MG capsule, Take 1 capsule by mouth once daily, Disp: 90 capsule, Rfl: 0    omeprazole (priLOSEC) 40 MG capsule, Take 1 capsule by mouth Daily., Disp: 90 capsule, Rfl: 0    promethazine (PHENERGAN) 25 MG tablet, TAKE 1/2 (ONE-HALF) TABLET BY MOUTH EVERY 6 HOURS AS NEEDED FOR NAUSEA AND FOR VOMITING, Disp: 45 tablet, Rfl: 0    promethazine (PHENERGAN) 25 MG tablet, Take 0.5 tablets by mouth Every 6 (Six) Hours As Needed for Nausea or Vomiting., Disp: 45 tablet, Rfl: 0    spironolactone (ALDACTONE) 25 MG tablet, Take 1 tablet by mouth Daily., Disp: , Rfl:     sucralfate (CARAFATE) 1 g tablet, Take 1 tablet by mouth 4 (Four) Times a Day., Disp: 120 tablet, Rfl: 0    SUMAtriptan (IMITREX) 100 MG tablet, Take one tablet at onset of headache. May repeat dose one time in 2 hours if headache not relieved., Disp: 10 tablet, Rfl: 2    traMADol (ULTRAM) 50 MG tablet, Take 1 tablet by mouth Every 6 (Six) Hours As Needed for Moderate Pain., Disp: , Rfl:     venlafaxine (EFFEXOR) 37.5 MG tablet, Take 1 tablet by mouth Daily., Disp: , Rfl:     amoxicillin-clavulanate (AUGMENTIN) 875-125 MG per tablet, Take 1 tablet by mouth 2 (Two) Times a Day., Disp: 10 tablet, Rfl: 0    Allergies:   Allergies   Allergen Reactions    Celecoxib Other (See Comments)    Lyrica [Pregabalin] Other  "(See Comments)     nightmares    Bactrim [Sulfamethoxazole-Trimethoprim] Rash       Objective     Physical Exam:  Vital Signs:   Vitals:    12/12/24 1128   BP: 100/80   BP Location: Left arm   Patient Position: Sitting   Cuff Size: Adult   Pulse: 98   Resp: 16   Temp: 98.2 °F (36.8 °C)   TempSrc: Temporal   SpO2: 98%   Weight: 65.9 kg (145 lb 3.2 oz)   Height: 165.1 cm (65\")   PainSc:   2     Body mass index is 24.16 kg/m².     Physical Exam  Constitutional:       General: She is not in acute distress.  HENT:      Head: Normocephalic and atraumatic.      Nose:      Right Sinus: Maxillary sinus tenderness present.      Left Sinus: Maxillary sinus tenderness present.   Pulmonary:      Effort: Pulmonary effort is normal. No respiratory distress.   Musculoskeletal:      Right lower leg: No edema.      Left lower leg: No edema.   Skin:     Findings: No rash.   Neurological:      General: No focal deficit present.      Mental Status: She is alert and oriented to person, place, and time.   Psychiatric:         Mood and Affect: Mood normal.         Behavior: Behavior normal.         Thought Content: Thought content normal.         Judgment: Judgment normal.         Assessment / Plan      Assessment/Plan:   Problems Addressed This Visit    Assessment & Plan  1.  Acute bacterial rhinosinusitis  Symptoms have persisted for several weeks, including congestion, cough, pain behind the eyes, and yellowish nasal drainage. She has a history of chronic sinusitis and sinus surgery. Examination revealed pain and pressure in the sinus areas. A prescription for Augmentin 875 mg, to be taken twice daily for 5 days, has been issued. She is advised to continue using nasal sprays and perform prewash routines. Nasal rinses should also be continued as they have helped with the cough.    Follow-up  The patient will follow up in 02/2025 for her Medicare wellness visit.             Plan of care reviewed with patient at the conclusion of today's " visit. Education was provided regarding diagnosis and management.  Patient verbalizes understanding of and agreement with management plan.    Follow Up:   Return for Next scheduled follow up, Medicare Wellness.        Jevon Traylor MD  [facility]  [address] [address] [address]  Fax [phone]  Phone [phone]    Patient or patient representative verbalized consent for the use of Ambient Listening during the visit with  Jevon Traylor MD for chart documentation. 12/12/2024  11:42 EST

## 2024-12-16 ENCOUNTER — PREP FOR SURGERY (OUTPATIENT)
Dept: SURGERY | Facility: SURGERY CENTER | Age: 54
End: 2024-12-16
Payer: MEDICARE

## 2024-12-16 DIAGNOSIS — K22.70 BARRETT'S ESOPHAGUS WITHOUT DYSPLASIA: ICD-10-CM

## 2024-12-16 DIAGNOSIS — R10.13 EPIGASTRIC PAIN: Primary | ICD-10-CM

## 2024-12-16 RX ORDER — SODIUM CHLORIDE, SODIUM LACTATE, POTASSIUM CHLORIDE, CALCIUM CHLORIDE 600; 310; 30; 20 MG/100ML; MG/100ML; MG/100ML; MG/100ML
30 INJECTION, SOLUTION INTRAVENOUS CONTINUOUS PRN
OUTPATIENT
Start: 2024-12-16 | End: 2024-12-16

## 2024-12-16 RX ORDER — SODIUM CHLORIDE 0.9 % (FLUSH) 0.9 %
3 SYRINGE (ML) INJECTION EVERY 12 HOURS SCHEDULED
OUTPATIENT
Start: 2024-12-16

## 2024-12-16 RX ORDER — SODIUM CHLORIDE 0.9 % (FLUSH) 0.9 %
10 SYRINGE (ML) INJECTION AS NEEDED
OUTPATIENT
Start: 2024-12-16

## 2025-02-13 ENCOUNTER — ANESTHESIA (OUTPATIENT)
Dept: SURGERY | Facility: SURGERY CENTER | Age: 55
End: 2025-02-13
Payer: MEDICARE

## 2025-02-13 ENCOUNTER — HOSPITAL ENCOUNTER (OUTPATIENT)
Facility: SURGERY CENTER | Age: 55
Setting detail: HOSPITAL OUTPATIENT SURGERY
Discharge: HOME | End: 2025-02-13
Attending: INTERNAL MEDICINE | Admitting: INTERNAL MEDICINE
Payer: MEDICARE

## 2025-02-13 ENCOUNTER — ANESTHESIA EVENT (OUTPATIENT)
Dept: SURGERY | Facility: SURGERY CENTER | Age: 55
End: 2025-02-13
Payer: MEDICARE

## 2025-02-13 VITALS
WEIGHT: 146 LBS | DIASTOLIC BLOOD PRESSURE: 85 MMHG | OXYGEN SATURATION: 98 % | HEART RATE: 88 BPM | SYSTOLIC BLOOD PRESSURE: 126 MMHG | RESPIRATION RATE: 16 BRPM | HEIGHT: 65 IN | BODY MASS INDEX: 24.32 KG/M2 | TEMPERATURE: 98.4 F

## 2025-02-13 DIAGNOSIS — K22.70 BARRETT'S ESOPHAGUS WITHOUT DYSPLASIA: ICD-10-CM

## 2025-02-13 DIAGNOSIS — R10.13 EPIGASTRIC PAIN: ICD-10-CM

## 2025-02-13 PROCEDURE — 25010000002 LIDOCAINE 2% SOLUTION: Performed by: NURSE ANESTHETIST, CERTIFIED REGISTERED

## 2025-02-13 PROCEDURE — 25010000002 PROPOFOL 10 MG/ML EMULSION: Performed by: NURSE ANESTHETIST, CERTIFIED REGISTERED

## 2025-02-13 PROCEDURE — 43239 EGD BIOPSY SINGLE/MULTIPLE: CPT | Performed by: INTERNAL MEDICINE

## 2025-02-13 PROCEDURE — 88305 TISSUE EXAM BY PATHOLOGIST: CPT | Performed by: INTERNAL MEDICINE

## 2025-02-13 PROCEDURE — 88342 IMHCHEM/IMCYTCHM 1ST ANTB: CPT | Performed by: INTERNAL MEDICINE

## 2025-02-13 PROCEDURE — 25810000003 LACTATED RINGERS PER 1000 ML: Performed by: INTERNAL MEDICINE

## 2025-02-13 PROCEDURE — 25010000002 GLYCOPYRROLATE 1 MG/5ML SOLUTION: Performed by: NURSE ANESTHETIST, CERTIFIED REGISTERED

## 2025-02-13 PROCEDURE — 25010000002 PROPOFOL 1000 MG/100ML EMULSION: Performed by: NURSE ANESTHETIST, CERTIFIED REGISTERED

## 2025-02-13 RX ORDER — SODIUM CHLORIDE 0.9 % (FLUSH) 0.9 %
10 SYRINGE (ML) INJECTION AS NEEDED
Status: DISCONTINUED | OUTPATIENT
Start: 2025-02-13 | End: 2025-02-13 | Stop reason: HOSPADM

## 2025-02-13 RX ORDER — GLYCOPYRROLATE 0.2 MG/ML
INJECTION INTRAMUSCULAR; INTRAVENOUS AS NEEDED
Status: DISCONTINUED | OUTPATIENT
Start: 2025-02-13 | End: 2025-02-13 | Stop reason: SURG

## 2025-02-13 RX ORDER — LIDOCAINE HYDROCHLORIDE 20 MG/ML
INJECTION, SOLUTION INFILTRATION; PERINEURAL AS NEEDED
Status: DISCONTINUED | OUTPATIENT
Start: 2025-02-13 | End: 2025-02-13 | Stop reason: SURG

## 2025-02-13 RX ORDER — LORATADINE 10 MG/1
CAPSULE, LIQUID FILLED ORAL
COMMUNITY

## 2025-02-13 RX ORDER — PROPOFOL 10 MG/ML
INJECTION, EMULSION INTRAVENOUS AS NEEDED
Status: DISCONTINUED | OUTPATIENT
Start: 2025-02-13 | End: 2025-02-13 | Stop reason: SURG

## 2025-02-13 RX ORDER — PROGESTERONE 100 MG/1
100 CAPSULE ORAL
COMMUNITY
Start: 2025-01-27

## 2025-02-13 RX ORDER — PANTOPRAZOLE SODIUM 40 MG/1
40 TABLET, DELAYED RELEASE ORAL DAILY
Qty: 30 TABLET | Refills: 5 | Status: SHIPPED | OUTPATIENT
Start: 2025-02-13

## 2025-02-13 RX ORDER — BETAMETHASONE DIPROPIONATE 0.5 MG/ML
LOTION, AUGMENTED TOPICAL
COMMUNITY
Start: 2025-02-10

## 2025-02-13 RX ORDER — SODIUM CHLORIDE 0.9 % (FLUSH) 0.9 %
3 SYRINGE (ML) INJECTION EVERY 12 HOURS SCHEDULED
Status: DISCONTINUED | OUTPATIENT
Start: 2025-02-13 | End: 2025-02-13 | Stop reason: HOSPADM

## 2025-02-13 RX ORDER — SODIUM CHLORIDE, SODIUM LACTATE, POTASSIUM CHLORIDE, CALCIUM CHLORIDE 600; 310; 30; 20 MG/100ML; MG/100ML; MG/100ML; MG/100ML
30 INJECTION, SOLUTION INTRAVENOUS CONTINUOUS PRN
Status: DISCONTINUED | OUTPATIENT
Start: 2025-02-13 | End: 2025-02-13 | Stop reason: HOSPADM

## 2025-02-13 RX ORDER — KETOCONAZOLE 20 MG/ML
SHAMPOO, SUSPENSION TOPICAL
COMMUNITY
Start: 2025-02-10

## 2025-02-13 RX ADMIN — LIDOCAINE HYDROCHLORIDE 60 MG: 20 INJECTION, SOLUTION INFILTRATION; PERINEURAL at 07:30

## 2025-02-13 RX ADMIN — PROPOFOL 100 MG: 10 INJECTION, EMULSION INTRAVENOUS at 07:30

## 2025-02-13 RX ADMIN — PROPOFOL 140 MCG/KG/MIN: 10 INJECTION, EMULSION INTRAVENOUS at 07:31

## 2025-02-13 RX ADMIN — GLYCOPYRROLATE 0.2 MCG: 0.2 INJECTION, SOLUTION INTRAMUSCULAR; INTRAVENOUS at 07:30

## 2025-02-13 RX ADMIN — SODIUM CHLORIDE, POTASSIUM CHLORIDE, SODIUM LACTATE AND CALCIUM CHLORIDE 30 ML/HR: 600; 310; 30; 20 INJECTION, SOLUTION INTRAVENOUS at 06:54

## 2025-02-13 NOTE — ANESTHESIA PREPROCEDURE EVALUATION
Anesthesia Evaluation     Patient summary reviewed   no history of anesthetic complications:   NPO Solid Status: > 8 hours  NPO Liquid Status: > 2 hours           Airway   Mallampati: I  TM distance: >3 FB  Neck ROM: full  no difficulty expected  Dental      Pulmonary     breath sounds clear to auscultation  (-) shortness of breath, recent URI, not a smokerSleep apnea: STOP BANG 1.    PE comment: nonlabored  Cardiovascular   Exercise tolerance: good (4-7 METS)    Rhythm: regular  Rate: normal    (-) past MI, dysrhythmias, angina      Neuro/Psych  (-) seizures, CVA  GI/Hepatic/Renal/Endo    (+) GERD (prvs Noonan's dx), GI bleeding (rectal bleeding)   (-)  obesity    ROS Comment: IBS w/ constipation;  Gastritis;  Nausea    Musculoskeletal     (-) neck stiffness  Abdominal    Substance History      OB/GYN          Other      history of cancer (h/o Breast) remission                      Anesthesia Plan    ASA 2     MAC     (MAC anesthesia discussed with patient and/or patient representative. Risks (including but not limited to intra-op awareness), benefits, and alternatives were discussed. Understanding was voiced with an agreement to proceed with a MAC technique and General as a backup option. )    Anesthetic plan, risks, benefits, and alternatives have been provided, discussed and informed consent has been obtained with: patient.      CODE STATUS:

## 2025-02-13 NOTE — ANESTHESIA POSTPROCEDURE EVALUATION
"Patient: Jennie Pederson    Procedure Summary       Date: 02/13/25 Room / Location[facility]  [facility]    Anesthesia Start: 0726 Anesthesia Stop: 0741    Procedure: ESOPHAGOGASTRODUODENOSCOPY Diagnosis:       Epigastric pain      Noonan's esophagus without dysplasia      (Epigastric pain [R10.13])      (Noonan's esophagus without dysplasia [K22.70])    Surgeons: En Ruiz MD Provider: Bismark Alva DO    Anesthesia Type: MAC ASA Status: 2            Anesthesia Type: MAC    Vitals  Vitals Value Taken Time   /85 02/13/25 0809   Temp 36.9 °C (98.4 °F) 02/13/25 0744   Pulse 88 02/13/25 0800   Resp 16 02/13/25 0800   SpO2 98 % 02/13/25 0800   Vitals shown include unfiled device data.        Post Anesthesia Care and Evaluation    Patient location during evaluation: bedside  Patient participation: complete - patient participated  Level of consciousness: awake and alert  Pain management: adequate    Airway patency: patent  Anesthetic complications: No anesthetic complications  PONV Status: controlled  Cardiovascular status: acceptable and hemodynamically stable  Respiratory status: acceptable, spontaneous ventilation and nonlabored ventilation  Hydration status: acceptable    Comments: /85   Pulse 88   Temp 36.9 °C (98.4 °F) (Temporal)   Resp 16   Ht 165.1 cm (65\")   Wt 66.2 kg (146 lb)   SpO2 98%   BMI 24.30 kg/m²       "

## 2025-02-13 NOTE — H&P
No chief complaint on file.      HPI  Epigastric pain  barretts         Problem List:    Patient Active Problem List   Diagnosis    Epigastric pain    Complaint of melena    NSAID long-term use    Rectal bleeding    Nausea    Abnormal CT scan, stomach    Noonan's esophagus without dysplasia    Irritable bowel syndrome with constipation    Abdominal bloating    History of breast cancer       Medical History:    Past Medical History:   Diagnosis Date    Noonan's esophagus without dysplasia 03/13/2023    Cancer 2014 & 2021    Breast cancer- DMX    Colon polyp 2019    Gastritis 2021    GERD (gastroesophageal reflux disease)     Hernia 2009    3 hernias    Irritable bowel syndrome 2009    Rectal bleeding 10/12/2022        Social History:    Social History     Socioeconomic History    Marital status:    Tobacco Use    Smoking status: Never    Smokeless tobacco: Never   Vaping Use    Vaping status: Never Used   Substance and Sexual Activity    Alcohol use: Yes     Comment: Maybe 3 glasses a month    Drug use: Never    Sexual activity: Yes     Partners: Male     Birth control/protection: Post-menopausal       Family History:   Family History   Problem Relation Age of Onset    Colon polyps Father     Colon cancer Neg Hx     Crohn's disease Neg Hx     Irritable bowel syndrome Neg Hx     Ulcerative colitis Neg Hx        Surgical History:   Past Surgical History:   Procedure Laterality Date    ABDOMINAL SURGERY  2009    Laparoscopic (x2)    APPENDECTOMY  2009    BREAST RECONSTRUCTION Bilateral     2022    COLONOSCOPY  2019    COLONOSCOPY W/ POLYPECTOMY N/A 1/26/2023    Procedure: COLONOSCOPY WITH POLYPECTOMY;  Surgeon: En Ruiz MD;  Location: [facility] MAIN OR;  Service: Gastroenterology;  Laterality: N/A;  FAIR PREP, POLYPS X2,. HEMORRHOIDS    ENDOSCOPY N/A 1/26/2023    Procedure: ESOPHAGOGASTRODUODENOSCOPY WITH BIOPSY;  Surgeon: En Ruiz MD;  Location: [facility] MAIN OR;  Service: Gastroenterology;   Laterality: N/A;  IRREGULAR Z LINE, GASTRITIS WITH EROSION    ENDOSCOPY N/A 2/7/2024    Procedure: ESOPHAGOGASTRODUODENOSCOPY;  Surgeon: En Ruiz MD;  [place] [facility] [place];  Service: Gastroenterology;  Laterality: N/A;  Barretts Esophagus, Gastritis    HERNIA REPAIR  2009    3 hernias treated    MASTECTOMY Bilateral 2021    UPPER GASTROINTESTINAL ENDOSCOPY  2007         Current Facility-Administered Medications:     lactated ringers infusion, 30 mL/hr, Intravenous, Continuous PRN, En Ruiz MD, Last Rate: 30 mL/hr at 02/13/25 0654, 30 mL/hr at 02/13/25 0654    sodium chloride 0.9 % flush 10 mL, 10 mL, Intravenous, PRN, En Ruiz MD    sodium chloride 0.9 % flush 3 mL, 3 mL, Intravenous, Q12H, En Ruiz MD    Allergies:   Allergies   Allergen Reactions    Celecoxib Other (See Comments)    Lyrica [Pregabalin] Other (See Comments)     nightmares    Bactrim [Sulfamethoxazole-Trimethoprim] Rash        The following portions of the patient's history were reviewed by me and updated as appropriate: review of systems, allergies, current medications, past family history, past medical history, past social history, past surgical history and problem list.    Vitals:    02/13/25 0649   BP: 136/75   Pulse: 79   Resp: 16   Temp: 97.5 °F (36.4 °C)   SpO2: 98%       PHYSICAL EXAM:    CONSTITUTIONAL:  today's vital signs reviewed by me  GASTROINTESTINAL: abdomen is soft nontender nondistended with normal active bowel sounds, no masses are appreciated    Assessment/ Plan  Epigastric pain      egd    Risks and benefits as well as alternatives to endoscopic evaluation were explained to the patient and they voiced understanding and wish to proceed.  These risks include but are not limited to the risk of bleeding, perforation, adverse reaction to sedation, and missed lesions.  The patient was given the opportunity to ask questions prior to the endoscopic procedure.

## 2025-02-15 LAB
CYTO UR: NORMAL
LAB AP CASE REPORT: NORMAL
LAB AP DIAGNOSIS COMMENT: NORMAL
PATH REPORT.ADDENDUM SPEC: NORMAL
PATH REPORT.FINAL DX SPEC: NORMAL
PATH REPORT.GROSS SPEC: NORMAL

## 2025-03-04 DIAGNOSIS — K92.1 COMPLAINT OF MELENA: ICD-10-CM

## 2025-03-04 DIAGNOSIS — R93.3 ABNORMAL CT SCAN, STOMACH: ICD-10-CM

## 2025-03-04 DIAGNOSIS — R10.13 EPIGASTRIC PAIN: ICD-10-CM

## 2025-03-04 RX ORDER — PROMETHAZINE HYDROCHLORIDE 25 MG/1
TABLET ORAL
Qty: 45 TABLET | Refills: 0 | Status: SHIPPED | OUTPATIENT
Start: 2025-03-04

## 2025-04-18 ENCOUNTER — TRANSCRIBE ORDERS (OUTPATIENT)
Dept: ADMINISTRATIVE | Facility: HOSPITAL | Age: 55
End: 2025-04-18
Payer: MEDICARE

## 2025-04-18 DIAGNOSIS — N64.4 BREAST PAIN, LEFT: ICD-10-CM

## 2025-04-18 DIAGNOSIS — D05.12 INTRADUCTAL CARCINOMA IN SITU OF LEFT BREAST: Primary | ICD-10-CM

## 2025-04-18 DIAGNOSIS — N64.4 MASTODYNIA: ICD-10-CM

## 2025-04-18 DIAGNOSIS — D05.11 INTRADUCTAL CARCINOMA IN SITU OF RIGHT BREAST: ICD-10-CM

## 2025-04-23 DIAGNOSIS — R10.13 EPIGASTRIC PAIN: ICD-10-CM

## 2025-04-23 DIAGNOSIS — R93.3 ABNORMAL CT SCAN, STOMACH: ICD-10-CM

## 2025-04-23 DIAGNOSIS — K92.1 COMPLAINT OF MELENA: ICD-10-CM

## 2025-04-23 RX ORDER — PROMETHAZINE HYDROCHLORIDE 25 MG/1
TABLET ORAL
Qty: 45 TABLET | Refills: 0 | Status: SHIPPED | OUTPATIENT
Start: 2025-04-23

## 2025-05-05 ENCOUNTER — HOSPITAL ENCOUNTER (OUTPATIENT)
Dept: ULTRASOUND IMAGING | Facility: HOSPITAL | Age: 55
Discharge: HOME | End: 2025-05-05
Admitting: PHYSICIAN ASSISTANT
Payer: MEDICARE

## 2025-05-05 DIAGNOSIS — N64.4 BREAST PAIN, LEFT: ICD-10-CM

## 2025-05-05 PROCEDURE — 76642 ULTRASOUND BREAST LIMITED: CPT

## 2025-06-06 RX ORDER — OMEPRAZOLE 40 MG/1
40 CAPSULE, DELAYED RELEASE ORAL DAILY
Qty: 90 CAPSULE | Refills: 0 | OUTPATIENT
Start: 2025-06-06

## 2025-06-17 DIAGNOSIS — R93.3 ABNORMAL CT SCAN, STOMACH: ICD-10-CM

## 2025-06-17 DIAGNOSIS — R10.13 EPIGASTRIC PAIN: ICD-10-CM

## 2025-06-17 DIAGNOSIS — K92.1 COMPLAINT OF MELENA: ICD-10-CM

## 2025-06-17 RX ORDER — PROMETHAZINE HYDROCHLORIDE 25 MG/1
12.5 TABLET ORAL EVERY 6 HOURS PRN
Qty: 45 TABLET | Refills: 0 | Status: SHIPPED | OUTPATIENT
Start: 2025-06-17

## 2025-06-17 RX ORDER — OMEPRAZOLE 40 MG/1
40 CAPSULE, DELAYED RELEASE ORAL DAILY
Qty: 90 CAPSULE | Refills: 0 | Status: SHIPPED | OUTPATIENT
Start: 2025-06-17 | End: 2025-06-18

## 2025-06-17 RX ORDER — PROMETHAZINE HYDROCHLORIDE 25 MG/1
TABLET ORAL
Qty: 45 TABLET | Refills: 0 | Status: SHIPPED | OUTPATIENT
Start: 2025-06-17

## 2025-06-17 RX ORDER — SUCRALFATE 1 G/1
1 TABLET ORAL 4 TIMES DAILY
Qty: 120 TABLET | Refills: 0 | Status: SHIPPED | OUTPATIENT
Start: 2025-06-17

## 2025-06-18 RX ORDER — PANTOPRAZOLE SODIUM 40 MG/1
40 TABLET, DELAYED RELEASE ORAL DAILY
Qty: 30 TABLET | Refills: 5 | Status: SHIPPED | OUTPATIENT
Start: 2025-06-18

## 2025-07-16 DIAGNOSIS — K92.1 COMPLAINT OF MELENA: ICD-10-CM

## 2025-07-16 DIAGNOSIS — R10.13 EPIGASTRIC PAIN: ICD-10-CM

## 2025-07-16 DIAGNOSIS — R93.3 ABNORMAL CT SCAN, STOMACH: ICD-10-CM

## 2025-07-16 RX ORDER — SUCRALFATE 1 G/1
1 TABLET ORAL 4 TIMES DAILY
Qty: 120 TABLET | Refills: 0 | Status: SHIPPED | OUTPATIENT
Start: 2025-07-16

## 2025-07-30 DIAGNOSIS — R10.13 EPIGASTRIC PAIN: ICD-10-CM

## 2025-07-30 DIAGNOSIS — R93.3 ABNORMAL CT SCAN, STOMACH: ICD-10-CM

## 2025-07-30 DIAGNOSIS — K92.1 COMPLAINT OF MELENA: ICD-10-CM

## 2025-07-31 RX ORDER — PROMETHAZINE HYDROCHLORIDE 25 MG/1
12.5 TABLET ORAL EVERY 6 HOURS PRN
Qty: 180 TABLET | Refills: 1 | Status: SHIPPED | OUTPATIENT
Start: 2025-07-31

## (undated) DEVICE — KT ORCA ORCAPOD DISP STRL

## (undated) DEVICE — ADAPT CLN SCPE ENDO PORPOISE BX/50 DISP

## (undated) DEVICE — VIAL FORMLN CAP 10PCT 20ML

## (undated) DEVICE — Device

## (undated) DEVICE — BITEBLOCK OMNI BLOC

## (undated) DEVICE — MSK ENDO PORT O2 POM ELITE CURAPLEX A/

## (undated) DEVICE — GOWN PROC ENDOARMOR GI LVL3 HY/SHLD UNIV

## (undated) DEVICE — BLCK/BITE BLOX W/DENTL/RIM W/STRAP 54F

## (undated) DEVICE — LINER CANSTR SXN MEDIVAC FLX/ADV 1500ML

## (undated) DEVICE — FLEX ADVANTAGE 1500CC: Brand: FLEX ADVANTAGE

## (undated) DEVICE — CANN NASL CO2 TRULINK W/O2 A/

## (undated) DEVICE — SINGLE-USE BIOPSY FORCEPS: Brand: RADIAL JAW 4

## (undated) DEVICE — FRCP BX RADJAW4 NDL 2.8 240CM LG OG BX80

## (undated) DEVICE — LAB CORP CLOTEST UREASE